# Patient Record
Sex: FEMALE | Race: OTHER | URBAN - METROPOLITAN AREA
[De-identification: names, ages, dates, MRNs, and addresses within clinical notes are randomized per-mention and may not be internally consistent; named-entity substitution may affect disease eponyms.]

---

## 2023-08-22 ENCOUNTER — HOSPITAL ENCOUNTER (INPATIENT)
Facility: HOSPITAL | Age: 88
LOS: 3 days | Discharge: HOME HEALTH | DRG: 840 | End: 2023-08-25
Attending: INTERNAL MEDICINE | Admitting: INTERNAL MEDICINE
Payer: MEDICARE

## 2023-08-22 DIAGNOSIS — R53.81 DEBILITY: ICD-10-CM

## 2023-08-22 DIAGNOSIS — C95.00 ACUTE LEUKEMIA NOT HAVING ACHIEVED REMISSION: ICD-10-CM

## 2023-08-22 DIAGNOSIS — J96.01 ACUTE HYPOXEMIC RESPIRATORY FAILURE: Primary | ICD-10-CM

## 2023-08-22 DIAGNOSIS — J18.9 PNEUMONIA DUE TO INFECTIOUS ORGANISM, UNSPECIFIED LATERALITY, UNSPECIFIED PART OF LUNG: ICD-10-CM

## 2023-08-22 DIAGNOSIS — C92.00 AML (ACUTE MYELOBLASTIC LEUKEMIA): ICD-10-CM

## 2023-08-22 PROBLEM — R41.82 ALTERED MENTAL STATUS: Status: ACTIVE | Noted: 2023-08-22

## 2023-08-22 LAB
ABO + RH BLD: NORMAL
ABO + RH BLD: NORMAL
ALBUMIN SERPL BCP-MCNC: 2.2 G/DL (ref 3.5–5.2)
ALBUMIN SERPL BCP-MCNC: 2.2 G/DL (ref 3.5–5.2)
ALP SERPL-CCNC: 90 U/L (ref 55–135)
ALP SERPL-CCNC: 91 U/L (ref 55–135)
ALT SERPL W/O P-5'-P-CCNC: 7 U/L (ref 10–44)
ALT SERPL W/O P-5'-P-CCNC: 9 U/L (ref 10–44)
ANION GAP SERPL CALC-SCNC: 4 MMOL/L (ref 8–16)
ANION GAP SERPL CALC-SCNC: 4 MMOL/L (ref 8–16)
ASCENDING AORTA: 2.9 CM
AST SERPL-CCNC: 20 U/L (ref 10–40)
AST SERPL-CCNC: 20 U/L (ref 10–40)
AV INDEX (PROSTH): 0.8
AV MEAN GRADIENT: 5 MMHG
AV PEAK GRADIENT: 9 MMHG
AV VALVE AREA BY VELOCITY RATIO: 2.62 CM²
AV VALVE AREA: 2.43 CM²
AV VELOCITY RATIO: 0.86
BASOPHILS NFR BLD: 0 % (ref 0–1.9)
BILIRUB SERPL-MCNC: 0.2 MG/DL (ref 0.1–1)
BILIRUB SERPL-MCNC: 0.2 MG/DL (ref 0.1–1)
BILIRUB UR QL STRIP: NEGATIVE
BLD GP AB SCN CELLS X3 SERPL QL: NORMAL
BUN SERPL-MCNC: 35 MG/DL (ref 8–23)
BUN SERPL-MCNC: 35 MG/DL (ref 8–23)
CALCIUM SERPL-MCNC: 8.8 MG/DL (ref 8.7–10.5)
CALCIUM SERPL-MCNC: 8.8 MG/DL (ref 8.7–10.5)
CHLORIDE SERPL-SCNC: 99 MMOL/L (ref 95–110)
CHLORIDE SERPL-SCNC: 99 MMOL/L (ref 95–110)
CLARITY UR REFRACT.AUTO: CLEAR
CO2 SERPL-SCNC: 35 MMOL/L (ref 23–29)
CO2 SERPL-SCNC: 35 MMOL/L (ref 23–29)
COLOR UR AUTO: YELLOW
CREAT SERPL-MCNC: 0.9 MG/DL (ref 0.5–1.4)
CREAT SERPL-MCNC: 0.9 MG/DL (ref 0.5–1.4)
CV ECHO LV RWT: 0.47 CM
DIFFERENTIAL METHOD: ABNORMAL
DOP CALC AO PEAK VEL: 1.51 M/S
DOP CALC AO VTI: 31.62 CM
DOP CALC LVOT AREA: 3 CM2
DOP CALC LVOT DIAMETER: 1.97 CM
DOP CALC LVOT PEAK VEL: 1.3 M/S
DOP CALC LVOT STROKE VOLUME: 76.95 CM3
DOP CALCLVOT PEAK VEL VTI: 25.26 CM
E WAVE DECELERATION TIME: 239.94 MSEC
E/A RATIO: 1.16
E/E' RATIO: 16.13 M/S
ECHO LV POSTERIOR WALL: 0.9 CM (ref 0.6–1.1)
EOSINOPHIL NFR BLD: 0 % (ref 0–8)
ERYTHROCYTE [DISTWIDTH] IN BLOOD BY AUTOMATED COUNT: 15.2 % (ref 11.5–14.5)
EST. GFR  (NO RACE VARIABLE): >60 ML/MIN/1.73 M^2
EST. GFR  (NO RACE VARIABLE): >60 ML/MIN/1.73 M^2
FRACTIONAL SHORTENING: 43 % (ref 28–44)
GLUCOSE SERPL-MCNC: 150 MG/DL (ref 70–110)
GLUCOSE SERPL-MCNC: 151 MG/DL (ref 70–110)
GLUCOSE UR QL STRIP: NEGATIVE
HBV CORE AB SERPL QL IA: NORMAL
HBV SURFACE AG SERPL QL IA: NORMAL
HCT VFR BLD AUTO: 36.5 % (ref 37–48.5)
HGB BLD-MCNC: 10.7 G/DL (ref 12–16)
HGB UR QL STRIP: NEGATIVE
HIV 1+2 AB+HIV1 P24 AG SERPL QL IA: NORMAL
IMM GRANULOCYTES # BLD AUTO: ABNORMAL K/UL (ref 0–0.04)
IMM GRANULOCYTES NFR BLD AUTO: ABNORMAL % (ref 0–0.5)
INTERVENTRICULAR SEPTUM: 1.1 CM (ref 0.6–1.1)
KETONES UR QL STRIP: NEGATIVE
LA MAJOR: 5.53 CM
LA MINOR: 5.23 CM
LA WIDTH: 4.16 CM
LDH SERPL L TO P-CCNC: 296 U/L (ref 110–260)
LDH SERPL L TO P-CCNC: 306 U/L (ref 110–260)
LEFT ATRIUM SIZE: 3.36 CM
LEFT ATRIUM VOLUME: 63.87 CM3
LEFT INTERNAL DIMENSION IN SYSTOLE: 2.18 CM (ref 2.1–4)
LEFT VENTRICLE DIASTOLIC VOLUME: 46.4 ML
LEFT VENTRICLE SYSTOLIC VOLUME: 15.86 ML
LEFT VENTRICULAR INTERNAL DIMENSION IN DIASTOLE: 3.8 CM (ref 3.5–6)
LEFT VENTRICULAR MASS: 117.28 G
LEUKOCYTE ESTERASE UR QL STRIP: NEGATIVE
LV LATERAL E/E' RATIO: 15.13 M/S
LV SEPTAL E/E' RATIO: 17.29 M/S
LYMPHOCYTES NFR BLD: 11 % (ref 18–48)
MAGNESIUM SERPL-MCNC: 2.3 MG/DL (ref 1.6–2.6)
MAGNESIUM SERPL-MCNC: 2.3 MG/DL (ref 1.6–2.6)
MCH RBC QN AUTO: 29.3 PG (ref 27–31)
MCHC RBC AUTO-ENTMCNC: 29.3 G/DL (ref 32–36)
MCV RBC AUTO: 100 FL (ref 82–98)
MONOCYTES NFR BLD: 9 % (ref 4–15)
MV PEAK A VEL: 1.04 M/S
MV PEAK E VEL: 1.21 M/S
MV STENOSIS PRESSURE HALF TIME: 69.58 MS
MV VALVE AREA P 1/2 METHOD: 3.16 CM2
NEUTROPHILS NFR BLD: 43 % (ref 38–73)
NITRITE UR QL STRIP: NEGATIVE
NRBC BLD-RTO: 0 /100 WBC
PH UR STRIP: 6 [PH] (ref 5–8)
PHOSPHATE SERPL-MCNC: 2.6 MG/DL (ref 2.7–4.5)
PHOSPHATE SERPL-MCNC: 2.6 MG/DL (ref 2.7–4.5)
PISA TR MAX VEL: 2.98 M/S
PLATELET # BLD AUTO: 226 K/UL (ref 150–450)
PLATELET BLD QL SMEAR: ABNORMAL
PMV BLD AUTO: 9.7 FL (ref 9.2–12.9)
POTASSIUM SERPL-SCNC: 5.3 MMOL/L (ref 3.5–5.1)
POTASSIUM SERPL-SCNC: 5.3 MMOL/L (ref 3.5–5.1)
PROT SERPL-MCNC: 6.1 G/DL (ref 6–8.4)
PROT SERPL-MCNC: 6.1 G/DL (ref 6–8.4)
PROT UR QL STRIP: ABNORMAL
RA MAJOR: 4.79 CM
RA PRESSURE ESTIMATED: 3 MMHG
RA WIDTH: 4.25 CM
RBC # BLD AUTO: 3.65 M/UL (ref 4–5.4)
RIGHT ATRIUM VOLUME AREA LENGTH APICAL 4 CHAMBER: 63 ML
RIGHT VENTRICULAR END-DIASTOLIC DIMENSION: 3.65 CM
RV TB RVSP: 6 MMHG
SCHISTOCYTES BLD QL SMEAR: ABNORMAL
SINUS: 3.12 CM
SMUDGE CELLS BLD QL SMEAR: PRESENT
SODIUM SERPL-SCNC: 138 MMOL/L (ref 136–145)
SODIUM SERPL-SCNC: 138 MMOL/L (ref 136–145)
SP GR UR STRIP: 1.02 (ref 1–1.03)
SPECIMEN OUTDATE: NORMAL
STJ: 3.1 CM
TDI LATERAL: 0.08 M/S
TDI SEPTAL: 0.07 M/S
TDI: 0.08 M/S
TR MAX PG: 36 MMHG
TRICUSPID ANNULAR PLANE SYSTOLIC EXCURSION: 3.15 CM
TV REST PULMONARY ARTERY PRESSURE: 39 MMHG
URATE SERPL-MCNC: 9.3 MG/DL (ref 2.4–5.7)
URATE SERPL-MCNC: 9.4 MG/DL (ref 2.4–5.7)
URN SPEC COLLECT METH UR: ABNORMAL
WBC # BLD AUTO: 21.55 K/UL (ref 3.9–12.7)
WBC OTHER NFR BLD MANUAL: 37 %

## 2023-08-22 PROCEDURE — 80053 COMPREHEN METABOLIC PANEL: CPT | Mod: 91 | Performed by: NURSE PRACTITIONER

## 2023-08-22 PROCEDURE — 27000221 HC OXYGEN, UP TO 24 HOURS

## 2023-08-22 PROCEDURE — 88305 TISSUE EXAM BY PATHOLOGIST: CPT | Mod: 59 | Performed by: PATHOLOGY

## 2023-08-22 PROCEDURE — 88189 FLOWCYTOMETRY/READ 16 & >: CPT | Mod: ,,, | Performed by: PATHOLOGY

## 2023-08-22 PROCEDURE — 87340 HEPATITIS B SURFACE AG IA: CPT | Performed by: NURSE PRACTITIONER

## 2023-08-22 PROCEDURE — 93010 EKG 12-LEAD: ICD-10-PCS | Mod: ,,, | Performed by: INTERNAL MEDICINE

## 2023-08-22 PROCEDURE — 88364 CHG INSITU HYBRIDIZATION (FISH: ICD-10-PCS | Mod: 26,,, | Performed by: PATHOLOGY

## 2023-08-22 PROCEDURE — 88342 IMHCHEM/IMCYTCHM 1ST ANTB: CPT | Performed by: PATHOLOGY

## 2023-08-22 PROCEDURE — 99900035 HC TECH TIME PER 15 MIN (STAT)

## 2023-08-22 PROCEDURE — 88364 INSITU HYBRIDIZATION (FISH): CPT | Mod: 26,,, | Performed by: PATHOLOGY

## 2023-08-22 PROCEDURE — 83735 ASSAY OF MAGNESIUM: CPT | Performed by: STUDENT IN AN ORGANIZED HEALTH CARE EDUCATION/TRAINING PROGRAM

## 2023-08-22 PROCEDURE — 87389 HIV-1 AG W/HIV-1&-2 AB AG IA: CPT | Performed by: NURSE PRACTITIONER

## 2023-08-22 PROCEDURE — 88189 PR  FLOWCYTOMETRY/READ, 16 & > MARKERS: ICD-10-PCS | Mod: ,,, | Performed by: PATHOLOGY

## 2023-08-22 PROCEDURE — 86900 BLOOD TYPING SEROLOGIC ABO: CPT | Mod: 91 | Performed by: NURSE PRACTITIONER

## 2023-08-22 PROCEDURE — 88275 CYTOGENETICS 100-300: CPT | Mod: 59

## 2023-08-22 PROCEDURE — 88271 CYTOGENETICS DNA PROBE: CPT | Performed by: STUDENT IN AN ORGANIZED HEALTH CARE EDUCATION/TRAINING PROGRAM

## 2023-08-22 PROCEDURE — 80053 COMPREHEN METABOLIC PANEL: CPT | Performed by: STUDENT IN AN ORGANIZED HEALTH CARE EDUCATION/TRAINING PROGRAM

## 2023-08-22 PROCEDURE — 88365 INSITU HYBRIDIZATION (FISH): CPT | Mod: 26,,, | Performed by: PATHOLOGY

## 2023-08-22 PROCEDURE — 63600175 PHARM REV CODE 636 W HCPCS: Mod: UD | Performed by: STUDENT IN AN ORGANIZED HEALTH CARE EDUCATION/TRAINING PROGRAM

## 2023-08-22 PROCEDURE — 87040 BLOOD CULTURE FOR BACTERIA: CPT | Mod: 59 | Performed by: STUDENT IN AN ORGANIZED HEALTH CARE EDUCATION/TRAINING PROGRAM

## 2023-08-22 PROCEDURE — 85060 BLOOD SMEAR INTERPRETATION: CPT | Mod: ,,, | Performed by: PATHOLOGY

## 2023-08-22 PROCEDURE — 88365 PR  TISSUE HYBRIDIZATION: ICD-10-PCS | Mod: 26,,, | Performed by: PATHOLOGY

## 2023-08-22 PROCEDURE — 83735 ASSAY OF MAGNESIUM: CPT | Mod: 91 | Performed by: NURSE PRACTITIONER

## 2023-08-22 PROCEDURE — 88305 TISSUE EXAM BY PATHOLOGIST: ICD-10-PCS | Mod: 26,,, | Performed by: PATHOLOGY

## 2023-08-22 PROCEDURE — 86704 HEP B CORE ANTIBODY TOTAL: CPT | Performed by: NURSE PRACTITIONER

## 2023-08-22 PROCEDURE — 85027 COMPLETE CBC AUTOMATED: CPT | Performed by: STUDENT IN AN ORGANIZED HEALTH CARE EDUCATION/TRAINING PROGRAM

## 2023-08-22 PROCEDURE — 81450 HL NEO GSAP 5-50DNA/DNA&RNA: CPT | Performed by: STUDENT IN AN ORGANIZED HEALTH CARE EDUCATION/TRAINING PROGRAM

## 2023-08-22 PROCEDURE — 83615 LACTATE (LD) (LDH) ENZYME: CPT | Performed by: STUDENT IN AN ORGANIZED HEALTH CARE EDUCATION/TRAINING PROGRAM

## 2023-08-22 PROCEDURE — 88341 PR IHC OR ICC EACH ADD'L SINGLE ANTIBODY  STAINPR: ICD-10-PCS | Mod: 26,,, | Performed by: PATHOLOGY

## 2023-08-22 PROCEDURE — 88365 INSITU HYBRIDIZATION (FISH): CPT | Performed by: PATHOLOGY

## 2023-08-22 PROCEDURE — 88185 FLOWCYTOMETRY/TC ADD-ON: CPT | Mod: 59 | Performed by: PATHOLOGY

## 2023-08-22 PROCEDURE — 85060 PATHOLOGIST REVIEW: ICD-10-PCS | Mod: ,,, | Performed by: PATHOLOGY

## 2023-08-22 PROCEDURE — 85097 PR  BONE MARROW,SMEAR INTERPRETATION: ICD-10-PCS | Mod: ,,, | Performed by: PATHOLOGY

## 2023-08-22 PROCEDURE — 84550 ASSAY OF BLOOD/URIC ACID: CPT | Performed by: STUDENT IN AN ORGANIZED HEALTH CARE EDUCATION/TRAINING PROGRAM

## 2023-08-22 PROCEDURE — 93005 ELECTROCARDIOGRAM TRACING: CPT

## 2023-08-22 PROCEDURE — 30000890 MAYO MISCELLANEOUS TEST (REFLEX): Performed by: STUDENT IN AN ORGANIZED HEALTH CARE EDUCATION/TRAINING PROGRAM

## 2023-08-22 PROCEDURE — 85007 BL SMEAR W/DIFF WBC COUNT: CPT | Performed by: STUDENT IN AN ORGANIZED HEALTH CARE EDUCATION/TRAINING PROGRAM

## 2023-08-22 PROCEDURE — 84550 ASSAY OF BLOOD/URIC ACID: CPT | Mod: 91 | Performed by: NURSE PRACTITIONER

## 2023-08-22 PROCEDURE — 88311 DECALCIFY TISSUE: CPT | Performed by: PATHOLOGY

## 2023-08-22 PROCEDURE — 81305 MYD88 GENE P.LEU265PRO VRNT: CPT | Performed by: STUDENT IN AN ORGANIZED HEALTH CARE EDUCATION/TRAINING PROGRAM

## 2023-08-22 PROCEDURE — 94761 N-INVAS EAR/PLS OXIMETRY MLT: CPT

## 2023-08-22 PROCEDURE — 88311 DECALCIFY TISSUE: CPT | Mod: 26,,, | Performed by: PATHOLOGY

## 2023-08-22 PROCEDURE — 88341 IMHCHEM/IMCYTCHM EA ADD ANTB: CPT | Performed by: PATHOLOGY

## 2023-08-22 PROCEDURE — 88305 TISSUE EXAM BY PATHOLOGIST: CPT | Mod: 26,,, | Performed by: PATHOLOGY

## 2023-08-22 PROCEDURE — 88237 TISSUE CULTURE BONE MARROW: CPT | Performed by: STUDENT IN AN ORGANIZED HEALTH CARE EDUCATION/TRAINING PROGRAM

## 2023-08-22 PROCEDURE — 99223 1ST HOSP IP/OBS HIGH 75: CPT | Mod: AI,,, | Performed by: INTERNAL MEDICINE

## 2023-08-22 PROCEDURE — 88313 SPECIAL STAINS GROUP 2: CPT | Mod: 59 | Performed by: PATHOLOGY

## 2023-08-22 PROCEDURE — 85097 BONE MARROW INTERPRETATION: CPT | Mod: ,,, | Performed by: PATHOLOGY

## 2023-08-22 PROCEDURE — 20600001 HC STEP DOWN PRIVATE ROOM

## 2023-08-22 PROCEDURE — 84100 ASSAY OF PHOSPHORUS: CPT | Mod: 91 | Performed by: NURSE PRACTITIONER

## 2023-08-22 PROCEDURE — 99223 PR INITIAL HOSPITAL CARE,LEVL III: ICD-10-PCS | Mod: AI,,, | Performed by: INTERNAL MEDICINE

## 2023-08-22 PROCEDURE — 88299 UNLISTED CYTOGENETIC STUDY: CPT | Performed by: STUDENT IN AN ORGANIZED HEALTH CARE EDUCATION/TRAINING PROGRAM

## 2023-08-22 PROCEDURE — 88364 INSITU HYBRIDIZATION (FISH): CPT | Performed by: PATHOLOGY

## 2023-08-22 PROCEDURE — 84100 ASSAY OF PHOSPHORUS: CPT | Performed by: STUDENT IN AN ORGANIZED HEALTH CARE EDUCATION/TRAINING PROGRAM

## 2023-08-22 PROCEDURE — 88342 IMHCHEM/IMCYTCHM 1ST ANTB: CPT | Mod: 26,59,, | Performed by: PATHOLOGY

## 2023-08-22 PROCEDURE — 25000003 PHARM REV CODE 250: Mod: UD | Performed by: STUDENT IN AN ORGANIZED HEALTH CARE EDUCATION/TRAINING PROGRAM

## 2023-08-22 PROCEDURE — 88184 FLOWCYTOMETRY/ TC 1 MARKER: CPT | Performed by: PATHOLOGY

## 2023-08-22 PROCEDURE — 83615 LACTATE (LD) (LDH) ENZYME: CPT | Mod: 91 | Performed by: NURSE PRACTITIONER

## 2023-08-22 PROCEDURE — 88311 PR  DECALCIFY TISSUE: ICD-10-PCS | Mod: 26,,, | Performed by: PATHOLOGY

## 2023-08-22 PROCEDURE — 88341 IMHCHEM/IMCYTCHM EA ADD ANTB: CPT | Mod: 26,,, | Performed by: PATHOLOGY

## 2023-08-22 PROCEDURE — 88342 CHG IMMUNOCYTOCHEMISTRY: ICD-10-PCS | Mod: 26,59,, | Performed by: PATHOLOGY

## 2023-08-22 PROCEDURE — 88264 CHROMOSOME ANALYSIS 20-25: CPT | Performed by: STUDENT IN AN ORGANIZED HEALTH CARE EDUCATION/TRAINING PROGRAM

## 2023-08-22 PROCEDURE — 86900 BLOOD TYPING SEROLOGIC ABO: CPT | Performed by: INTERNAL MEDICINE

## 2023-08-22 PROCEDURE — 88313 SPECIAL STAINS GROUP 2: CPT | Mod: 26,,, | Performed by: PATHOLOGY

## 2023-08-22 PROCEDURE — 93010 ELECTROCARDIOGRAM REPORT: CPT | Mod: ,,, | Performed by: INTERNAL MEDICINE

## 2023-08-22 PROCEDURE — 81003 URINALYSIS AUTO W/O SCOPE: CPT | Performed by: STUDENT IN AN ORGANIZED HEALTH CARE EDUCATION/TRAINING PROGRAM

## 2023-08-22 PROCEDURE — 88271 CYTOGENETICS DNA PROBE: CPT | Mod: 59

## 2023-08-22 PROCEDURE — 88313 PR  SPECIAL STAINS,GROUP II: ICD-10-PCS | Mod: 26,,, | Performed by: PATHOLOGY

## 2023-08-22 RX ORDER — LIDOCAINE HYDROCHLORIDE 20 MG/ML
10 INJECTION, SOLUTION INFILTRATION; PERINEURAL ONCE
Status: DISCONTINUED | OUTPATIENT
Start: 2023-08-22 | End: 2023-08-22

## 2023-08-22 RX ORDER — IPRATROPIUM BROMIDE AND ALBUTEROL SULFATE 2.5; .5 MG/3ML; MG/3ML
3 SOLUTION RESPIRATORY (INHALATION) EVERY 6 HOURS
Status: DISCONTINUED | OUTPATIENT
Start: 2023-08-22 | End: 2023-08-22

## 2023-08-22 RX ORDER — NALOXONE HCL 0.4 MG/ML
0.02 VIAL (ML) INJECTION
Status: DISCONTINUED | OUTPATIENT
Start: 2023-08-22 | End: 2023-08-25 | Stop reason: HOSPADM

## 2023-08-22 RX ORDER — SODIUM CHLORIDE 0.9 % (FLUSH) 0.9 %
10 SYRINGE (ML) INJECTION EVERY 12 HOURS PRN
Status: DISCONTINUED | OUTPATIENT
Start: 2023-08-22 | End: 2023-08-25 | Stop reason: HOSPADM

## 2023-08-22 RX ORDER — IBUPROFEN 200 MG
16 TABLET ORAL
Status: DISCONTINUED | OUTPATIENT
Start: 2023-08-22 | End: 2023-08-25 | Stop reason: HOSPADM

## 2023-08-22 RX ORDER — ALLOPURINOL 300 MG/1
300 TABLET ORAL DAILY
Status: DISCONTINUED | OUTPATIENT
Start: 2023-08-23 | End: 2023-08-25 | Stop reason: HOSPADM

## 2023-08-22 RX ORDER — LORAZEPAM 2 MG/ML
0.5 INJECTION INTRAMUSCULAR ONCE AS NEEDED
Status: DISCONTINUED | OUTPATIENT
Start: 2023-08-22 | End: 2023-08-22

## 2023-08-22 RX ORDER — IPRATROPIUM BROMIDE AND ALBUTEROL SULFATE 2.5; .5 MG/3ML; MG/3ML
3 SOLUTION RESPIRATORY (INHALATION) EVERY 4 HOURS PRN
Status: DISCONTINUED | OUTPATIENT
Start: 2023-08-22 | End: 2023-08-25 | Stop reason: HOSPADM

## 2023-08-22 RX ORDER — GLUCAGON 1 MG
1 KIT INJECTION
Status: DISCONTINUED | OUTPATIENT
Start: 2023-08-22 | End: 2023-08-25 | Stop reason: HOSPADM

## 2023-08-22 RX ORDER — HYDROMORPHONE HYDROCHLORIDE 1 MG/ML
0.5 INJECTION, SOLUTION INTRAMUSCULAR; INTRAVENOUS; SUBCUTANEOUS ONCE AS NEEDED
Status: COMPLETED | OUTPATIENT
Start: 2023-08-22 | End: 2023-08-22

## 2023-08-22 RX ORDER — ASPIRIN 81 MG/1
81 TABLET ORAL DAILY
COMMUNITY

## 2023-08-22 RX ORDER — LOSARTAN POTASSIUM 50 MG/1
50 TABLET ORAL DAILY
COMMUNITY
Start: 2023-04-27

## 2023-08-22 RX ORDER — LIDOCAINE HYDROCHLORIDE 20 MG/ML
10 INJECTION, SOLUTION INFILTRATION; PERINEURAL ONCE
Status: COMPLETED | OUTPATIENT
Start: 2023-08-22 | End: 2023-08-22

## 2023-08-22 RX ORDER — IBUPROFEN 200 MG
24 TABLET ORAL
Status: DISCONTINUED | OUTPATIENT
Start: 2023-08-22 | End: 2023-08-25 | Stop reason: HOSPADM

## 2023-08-22 RX ADMIN — LIDOCAINE HYDROCHLORIDE 10 ML: 20 INJECTION, SOLUTION INFILTRATION; PERINEURAL at 02:08

## 2023-08-22 RX ADMIN — HYDROMORPHONE HYDROCHLORIDE 0.5 MG: 1 INJECTION, SOLUTION INTRAMUSCULAR; INTRAVENOUS; SUBCUTANEOUS at 02:08

## 2023-08-22 RX ADMIN — AZITHROMYCIN MONOHYDRATE 500 MG: 500 INJECTION, POWDER, LYOPHILIZED, FOR SOLUTION INTRAVENOUS at 05:08

## 2023-08-22 RX ADMIN — CEFTRIAXONE 1 G: 1 INJECTION, POWDER, FOR SOLUTION INTRAMUSCULAR; INTRAVENOUS at 05:08

## 2023-08-22 NOTE — H&P
[facility] - Oncology ([facility])  Hematology  Bone Marrow Transplant  H&P    Subjective:     Principal Problem: Acute leukemia    HPI: Patient is an 88 year old female with listed past medical history of pacemaker placement, history of COVID19 infection, history of PE (not currently on anticoagulation) who presented to outside hospital on 8/19 with shortness of breath. Patient is a poor historian and it is unclear what her baseline mental status is as family reports her as high functioning but she is unable to answer simple questions at bedside; history gathered with discussion with daughter over the phone and grand daughter at bedside. Daughter reports that patient was in her normal state of health until about 1 week ago when family was reporting that patient was not quite herself; she is unable to elaborate further in this regard. Patient was taken to the ED by her daughter because she felt that the patient looked off color and was having mucus production and shortness of breath. Outside hospital records not that she had O2 saturations as low as 40s and that she was saturating at 90s with 4L NC. She had CT chest with multifocal pneumonia and bilateral pleural effusions. She was treated with rocephin, doxycycline, and lasix. Patient was found to have leukocytosis of 50k. Pathologists review of cbc notes Large cells with blue cytoplasm and large nucleoli, negative for mikie rods, morphologically suggestive of monoblasts and promonocytes. Flow cytometry pending. Family at bedside report that she presented to the hospital 2 years ago with similar presentation and that she was noted to have significantly elevated WBC count at this time as well. They also note history of cancer in the family but are unable to elaborate further.       Patient information was obtained from patient, relative(s), past medical records, and ER records.     Oncology History: N/A     Medications Prior to Admission   Medication Sig Dispense Refill  Last Dose    aspirin (ECOTRIN) 81 MG EC tablet Take 81 mg by mouth once daily.   8/22/2023    losartan (COZAAR) 50 MG tablet Take 50 mg by mouth once daily.   Unknown       Shellfish containing products and Iodine     Past Medical History:   Diagnosis Date    Anemia, unspecified     Asthma     CHF (congestive heart failure)      History reviewed. No pertinent surgical history.  Family History       Problem Relation (Age of Onset)    Leukemia Father, Sister, Sister, Brother          Tobacco Use    Smoking status: Never    Smokeless tobacco: Never   Substance and Sexual Activity    Alcohol use: Never    Drug use: Never    Sexual activity: Not on file       Review of Systems   Unable to perform ROS: Other   Possible mental status change but with unknown baseline  Objective:     Vital Signs (Most Recent):  Temp: 99.2 °F (37.3 °C) (08/22/23 1205)  Pulse: 82 (08/22/23 1205)  Resp: 18 (08/22/23 1444)  BP: 135/62 (08/22/23 1205)  SpO2: 98 % (08/22/23 1221) Vital Signs (24h Range):  Temp:  [99.2 °F (37.3 °C)-99.9 °F (37.7 °C)] 99.2 °F (37.3 °C)  Pulse:  [82-89] 82  Resp:  [16-18] 18  SpO2:  [93 %-98 %] 98 %  BP: (130-135)/(56-62) 135/62     Weight: 55.8 kg (123 lb)  There is no height or weight on file to calculate BMI.  There is no height or weight on file to calculate BSA.      Lines/Drains/Airways       Peripheral Intravenous Line  Duration                  Peripheral IV - Single Lumen 08/19/23 18 G Anterior;Left;Proximal Forearm 3 days                     Physical Exam  Vitals reviewed.   Constitutional:       General: She is not in acute distress.     Appearance: She is well-developed. She is ill-appearing. She is not toxic-appearing.      Comments: Cachectic    HENT:      Head: Normocephalic and atraumatic.      Right Ear: External ear normal.      Left Ear: External ear normal.      Nose: Nose normal.      Mouth/Throat:      Mouth: Mucous membranes are moist.      Pharynx: Oropharynx is clear. No  oropharyngeal exudate.   Eyes:      General: No scleral icterus.     Extraocular Movements: Extraocular movements intact.      Conjunctiva/sclera: Conjunctivae normal.      Pupils: Pupils are equal, round, and reactive to light.   Neck:      Comments: Large nodule noted on right shoulder  Cardiovascular:      Rate and Rhythm: Normal rate and regular rhythm.      Heart sounds: Murmur (systolic) heard.   Pulmonary:      Effort: Pulmonary effort is normal.      Breath sounds: Rales (bilateral lung fields) present. No wheezing.   Abdominal:      General: Bowel sounds are normal. There is no distension.      Palpations: Abdomen is soft.      Tenderness: There is no abdominal tenderness.   Musculoskeletal:         General: No deformity. Normal range of motion.      Cervical back: Normal range of motion.      Right lower leg: No edema.      Left lower leg: No edema.   Skin:     General: Skin is warm and dry.   Neurological:      Mental Status: She is alert. She is disoriented.      Comments: Possible facial drop, no other cranial nerve deficit noted            Significant Labs:   All pertinent labs from the last 24 hours have been reviewed.    Diagnostic Results:  I have reviewed all pertinent imaging results/findings within the past 24 hours.    Assessment/Plan:     Neuro  Altered mental status  Patient with unclear baseline mentation. Daughter over the phone reports that patient has been living independently and capable of managing ADLs. However at time of exam, patient unable to answer simple questions. She is unable to repeat any medical information even with told her plan of care just prior. Mild facial drop of unknown chronicity on exam, remainder of exam grossly non focal.     - Repeat infectious work up, including UA  - Hold off on CT head for now    Pulmonary  Pneumonia due to infectious organism  Acute hypoxemic respiratory failure    Patient presented to outside hospital with hypoxemic respiratory failure with  CTA showing multifocal pneumonia. Patient currently on 4-5L NC. Afebrile currently and prior to transfer. Blood cultures without growth for the last 48 hours. Patient was treated with rocephin and doxycycline on 8/20.    - Continue with rocephin 1 g q24 hours for 5 days, and azithromycin 500 mg for 3 days  - Repeat blood cultures drawn  - incentive spirometry, acapella, and duoneb    Oncology  Acute leukemia  88 year old female with symptoms of generalized fatigue and worsening shortness of breath who presents as a transfer for concern of acute leukemia. Pathologist review of cbc notes relative blast count of 24% with report of large cells with blue cytoplasm and large nucleoli. Negative for Edward rods. Morphologically suggestive of monoblasts and promonocytes. Flow cytometry pending.     - Repeat cbc, cmp, mag, phos, ldh, uric acid on admit  - Will check coagulation studies and fibrinogen  - Bone marrow biopsy completed at bedside, sent to pathology with studies pending  - Hold off on hydrea for now  - allopurinol 300 mg daily    Other  Debility  - PT/OT consulted        VTE Risk Mitigation (From admission, onward)         Ordered     IP VTE HIGH RISK PATIENT  Once         08/22/23 1240     Place sequential compression device  Until discontinued         08/22/23 1240     Reason for No Pharmacological VTE Prophylaxis  Once        Question:  Reasons:  Answer:  Thrombocytopenia    08/22/23 1240                Disposition: Continue inpatient care    Gagandeep Perez MD  Bone Marrow Transplant  Hematology  [facility] - Oncology ([facility])

## 2023-08-22 NOTE — NURSING
Nurses Note -- 4 Eyes      8/22/2023   5:01 PM      Skin assessed during: Admit      [] No Altered Skin Integrity Present    []Prevention Measures Documented      [x] Yes- Altered Skin Integrity Present or Discovered   [] LDA Added if Not in Epic (Describe Wound)   [] New Altered Skin Integrity was Present on Admit and Documented in LDA   [] Wound Image Taken    Red nonblanchable to bilateral buttock. Sacral dressing applied. Will apply waffle mattress and start turn 2hr for pt. Also noted golf ball sized bump to right shoulder, MD aware. All other skin dry, clean, and intact.    Wound Care Consulted? No    Attending Nurse:  Italia Gayle RN    Second RN/Staff Member:   Monique Qureshi RN

## 2023-08-22 NOTE — SUBJECTIVE & OBJECTIVE
Patient information was obtained from patient, relative(s), past medical records, and ER records.     Oncology History: N/A     Medications Prior to Admission   Medication Sig Dispense Refill Last Dose    aspirin (ECOTRIN) 81 MG EC tablet Take 81 mg by mouth once daily.   8/22/2023    losartan (COZAAR) 50 MG tablet Take 50 mg by mouth once daily.   Unknown       Shellfish containing products and Iodine     Past Medical History:   Diagnosis Date    Anemia, unspecified     Asthma     CHF (congestive heart failure)      History reviewed. No pertinent surgical history.  Family History       Problem Relation (Age of Onset)    Leukemia Father, Sister, Sister, Brother          Tobacco Use    Smoking status: Never    Smokeless tobacco: Never   Substance and Sexual Activity    Alcohol use: Never    Drug use: Never    Sexual activity: Not on file       Review of Systems   Unable to perform ROS: Other   Possible mental status change but with unknown baseline  Objective:     Vital Signs (Most Recent):  Temp: 99.2 °F (37.3 °C) (08/22/23 1205)  Pulse: 82 (08/22/23 1205)  Resp: 18 (08/22/23 1444)  BP: 135/62 (08/22/23 1205)  SpO2: 98 % (08/22/23 1221) Vital Signs (24h Range):  Temp:  [99.2 °F (37.3 °C)-99.9 °F (37.7 °C)] 99.2 °F (37.3 °C)  Pulse:  [82-89] 82  Resp:  [16-18] 18  SpO2:  [93 %-98 %] 98 %  BP: (130-135)/(56-62) 135/62     Weight: 55.8 kg (123 lb)  There is no height or weight on file to calculate BMI.  There is no height or weight on file to calculate BSA.      Lines/Drains/Airways       Peripheral Intravenous Line  Duration                  Peripheral IV - Single Lumen 08/19/23 18 G Anterior;Left;Proximal Forearm 3 days                     Physical Exam  Vitals reviewed.   Constitutional:       General: She is not in acute distress.     Appearance: She is well-developed. She is ill-appearing. She is not toxic-appearing.      Comments: Cachectic    HENT:      Head: Normocephalic and atraumatic.      Right Ear:  External ear normal.      Left Ear: External ear normal.      Nose: Nose normal.      Mouth/Throat:      Mouth: Mucous membranes are moist.      Pharynx: Oropharynx is clear. No oropharyngeal exudate.   Eyes:      General: No scleral icterus.     Extraocular Movements: Extraocular movements intact.      Conjunctiva/sclera: Conjunctivae normal.      Pupils: Pupils are equal, round, and reactive to light.   Neck:      Comments: Large nodule noted on right shoulder  Cardiovascular:      Rate and Rhythm: Normal rate and regular rhythm.      Heart sounds: Murmur (systolic) heard.   Pulmonary:      Effort: Pulmonary effort is normal.      Breath sounds: Rales (bilateral lung fields) present. No wheezing.   Abdominal:      General: Bowel sounds are normal. There is no distension.      Palpations: Abdomen is soft.      Tenderness: There is no abdominal tenderness.   Musculoskeletal:         General: No deformity. Normal range of motion.      Cervical back: Normal range of motion.      Right lower leg: No edema.      Left lower leg: No edema.   Skin:     General: Skin is warm and dry.   Neurological:      Mental Status: She is alert. She is disoriented.      Comments: Possible facial drop, no other cranial nerve deficit noted            Significant Labs:   All pertinent labs from the last 24 hours have been reviewed.    Diagnostic Results:  I have reviewed all pertinent imaging results/findings within the past 24 hours.

## 2023-08-22 NOTE — ASSESSMENT & PLAN NOTE
88 year old female with symptoms of generalized fatigue and worsening shortness of breath who presents as a transfer for concern of acute leukemia. Pathologist review of cbc notes relative blast count of 24% with report of large cells with blue cytoplasm and large nucleoli. Negative for Edward rods. Morphologically suggestive of monoblasts and promonocytes. Flow cytometry pending.     - Repeat cbc, cmp, mag, phos, ldh, uric acid on admit  - Will check coagulation studies and fibrinogen  - Bone marrow biopsy completed at bedside, sent to pathology with studies pending  - Hold off on hydrea for now  - allopurinol 300 mg daily

## 2023-08-22 NOTE — ASSESSMENT & PLAN NOTE
Patient with unclear baseline mentation. Daughter over the phone reports that patient has been living independently and capable of managing ADLs. However at time of exam, patient unable to answer simple questions. She is unable to repeat any medical information even with told her plan of care just prior. Mild facial drop of unknown chronicity on exam, remainder of exam grossly non focal.     - Repeat infectious work up, including UA  - Hold off on CT head for now

## 2023-08-22 NOTE — PLAN OF CARE
Pt AAOx2. Disoriented to time and situation. Granddaughter and daughter at bedside. Bone marrow biopsy done at bedside. Pt tolerated well.  No drainage on bandage. UA collected. Awaiting results. Purewick in place. Red nonblanchable noted at nya buttocks. Sacrum dressing in place.Turn q2h. Antibiotics currently infusing. Tolerating diet, voiding without difficulty. All VSS; pt sats in 90s with 4L NC. Pt remaining free from falls or injury throughout shift; bed locked and in lowest position; call light within reach. Pt instructed to call for assistance as needed. Q1H rounding done on pt.

## 2023-08-22 NOTE — PROGRESS NOTES
Pt admitted into room 856. Pt arrived via stretcher with two nurses. MD notified of pt arrival.    Pt AAO x2. Disoriented to time and situation. Vitals and assessment as charted. Pacemaker noted at Left chest. Right lump noted at right shoulder. Red nonblanchable to nya buttocks.     Upon arrival to room, pt oriented to room, floor, and call light. Bed locked and in lowest position. Bed alarm in use. Call light within reach. Instructed to call for assistance.

## 2023-08-22 NOTE — ASSESSMENT & PLAN NOTE
Acute hypoxemic respiratory failure    Patient presented to outside hospital with hypoxemic respiratory failure with CTA showing multifocal pneumonia. Patient currently on 4-5L NC. Afebrile currently and prior to transfer. Blood cultures without growth for the last 48 hours. Patient was treated with rocephin and doxycycline on 8/20.    - Continue with rocephin 1 g q24 hours for 5 days, and azithromycin 500 mg for 3 days  - Repeat blood cultures drawn  - incentive spirometry, acapella, and duoneb

## 2023-08-22 NOTE — PROCEDURES
PROCEDURE NOTE:  Date of Procedure: 8/22/23  Procedure: Bone Marrow Biopsy and Aspiration  Indication: Concern for acute leukemia  Consent: Informed consent was obtained from patient.  Timeout: Done and documented.  Position: lateral decubitus   Site: Left posterior illiac crest.  Prep: Betadine.  Needle used: 11 gauge Jamshidi needle.  Anesthetic: 2% lidocaine 10 cc.  Biopsy: The biopsy needle was introduced into the marrow cavity and an aspirate was obtained without complications and sent for flow cytometry and cytogenetics. Core biopsy obtained without difficulty and sent for routine histologic examination.  Complications: None.  Disposition: The patient was placed supine for 15min following procedure. RN to assess bandaid for bleeding prior to discharge home.  Blood loss: Minimal.     Gagandeep Perez MD  PGY5  Malignant Hematology & Bone Marrow Transplant

## 2023-08-22 NOTE — HPI
Patient is an 88 year old female with listed past medical history of pacemaker placement, history of COVID19 infection, history of PE (not currently on anticoagulation) who presented to outside hospital on 8/19 with shortness of breath. Patient is a poor historian and it is unclear what her baseline mental status is as family reports her as high functioning but she is unable to answer simple questions at bedside; history gathered with discussion with daughter over the phone and grand daughter at bedside. Daughter reports that patient was in her normal state of health until about 1 week ago when family was reporting that patient was not quite herself; she is unable to elaborate further in this regard. Patient was taken to the ED by her daughter because she felt that the patient looked off color and was having mucus production and shortness of breath. Outside hospital records not that she had O2 saturations as low as 40s and that she was saturating at 90s with 4L NC. She had CT chest with multifocal pneumonia and bilateral pleural effusions. She was treated with rocephin, doxycycline, and lasix. Patient was found to have leukocytosis of 50k. Pathologists review of cbc notes Large cells with blue cytoplasm and large nucleoli, negative for mikie rods, morphologically suggestive of monoblasts and promonocytes. Flow cytometry pending. Family at bedside report that she presented to the hospital 2 years ago with similar presentation and that she was noted to have significantly elevated WBC count at this time as well. They also note history of cancer in the family but are unable to elaborate further.

## 2023-08-23 LAB
ALBUMIN SERPL BCP-MCNC: 2.1 G/DL (ref 3.5–5.2)
ALP SERPL-CCNC: 86 U/L (ref 55–135)
ALT SERPL W/O P-5'-P-CCNC: 8 U/L (ref 10–44)
ANION GAP SERPL CALC-SCNC: 6 MMOL/L (ref 8–16)
ANISOCYTOSIS BLD QL SMEAR: SLIGHT
APTT PPP: 28.5 SEC (ref 21–32)
AST SERPL-CCNC: 23 U/L (ref 10–40)
BASO STIPL BLD QL SMEAR: ABNORMAL
BASOPHILS # BLD AUTO: ABNORMAL K/UL (ref 0–0.2)
BASOPHILS NFR BLD: 0 % (ref 0–1.9)
BILIRUB SERPL-MCNC: 0.2 MG/DL (ref 0.1–1)
BUN SERPL-MCNC: 32 MG/DL (ref 8–23)
CALCIUM SERPL-MCNC: 8.9 MG/DL (ref 8.7–10.5)
CHLORIDE SERPL-SCNC: 101 MMOL/L (ref 95–110)
CO2 SERPL-SCNC: 32 MMOL/L (ref 23–29)
CREAT SERPL-MCNC: 0.6 MG/DL (ref 0.5–1.4)
DIFFERENTIAL METHOD: ABNORMAL
EOSINOPHIL # BLD AUTO: ABNORMAL K/UL (ref 0–0.5)
EOSINOPHIL NFR BLD: 0 % (ref 0–8)
ERYTHROCYTE [DISTWIDTH] IN BLOOD BY AUTOMATED COUNT: 15.3 % (ref 11.5–14.5)
EST. GFR  (NO RACE VARIABLE): >60 ML/MIN/1.73 M^2
FIBRINOGEN PPP-MCNC: 470 MG/DL (ref 182–400)
GIANT PLATELETS BLD QL SMEAR: PRESENT
GLUCOSE SERPL-MCNC: 88 MG/DL (ref 70–110)
HCT VFR BLD AUTO: 34.6 % (ref 37–48.5)
HGB BLD-MCNC: 10 G/DL (ref 12–16)
HYPOCHROMIA BLD QL SMEAR: ABNORMAL
IMM GRANULOCYTES # BLD AUTO: ABNORMAL K/UL (ref 0–0.04)
IMM GRANULOCYTES NFR BLD AUTO: ABNORMAL % (ref 0–0.5)
INR PPP: 1 (ref 0.8–1.2)
LDH SERPL L TO P-CCNC: 331 U/L (ref 110–260)
LYMPHOCYTES # BLD AUTO: ABNORMAL K/UL (ref 1–4.8)
LYMPHOCYTES NFR BLD: 13 % (ref 18–48)
MAGNESIUM SERPL-MCNC: 2.2 MG/DL (ref 1.6–2.6)
MCH RBC QN AUTO: 28.7 PG (ref 27–31)
MCHC RBC AUTO-ENTMCNC: 28.9 G/DL (ref 32–36)
MCV RBC AUTO: 99 FL (ref 82–98)
MONOCYTES # BLD AUTO: ABNORMAL K/UL (ref 0.3–1)
MONOCYTES NFR BLD: 4 % (ref 4–15)
MYELOCYTES NFR BLD MANUAL: 1 %
NEUTROPHILS NFR BLD: 40 % (ref 38–73)
NEUTS BAND NFR BLD MANUAL: 2 %
NRBC BLD-RTO: 0 /100 WBC
PATH REV BLD -IMP: NORMAL
PHOSPHATE SERPL-MCNC: 2.3 MG/DL (ref 2.7–4.5)
PLATELET # BLD AUTO: 201 K/UL (ref 150–450)
PLATELET BLD QL SMEAR: ABNORMAL
PMV BLD AUTO: 9.6 FL (ref 9.2–12.9)
POTASSIUM SERPL-SCNC: 5.7 MMOL/L (ref 3.5–5.1)
PROT SERPL-MCNC: 5.7 G/DL (ref 6–8.4)
PROTHROMBIN TIME: 10.9 SEC (ref 9–12.5)
RBC # BLD AUTO: 3.48 M/UL (ref 4–5.4)
SMUDGE CELLS BLD QL SMEAR: PRESENT
SODIUM SERPL-SCNC: 139 MMOL/L (ref 136–145)
SPHEROCYTES BLD QL SMEAR: ABNORMAL
TARGETS BLD QL SMEAR: ABNORMAL
URATE SERPL-MCNC: 8.9 MG/DL (ref 2.4–5.7)
WBC # BLD AUTO: 21.85 K/UL (ref 3.9–12.7)
WBC OTHER NFR BLD MANUAL: 40 %

## 2023-08-23 PROCEDURE — 99233 SBSQ HOSP IP/OBS HIGH 50: CPT | Mod: ,,, | Performed by: INTERNAL MEDICINE

## 2023-08-23 PROCEDURE — 36415 COLL VENOUS BLD VENIPUNCTURE: CPT | Performed by: NURSE PRACTITIONER

## 2023-08-23 PROCEDURE — 83735 ASSAY OF MAGNESIUM: CPT | Performed by: NURSE PRACTITIONER

## 2023-08-23 PROCEDURE — 85007 BL SMEAR W/DIFF WBC COUNT: CPT | Performed by: NURSE PRACTITIONER

## 2023-08-23 PROCEDURE — 63600175 PHARM REV CODE 636 W HCPCS: Mod: UD | Performed by: STUDENT IN AN ORGANIZED HEALTH CARE EDUCATION/TRAINING PROGRAM

## 2023-08-23 PROCEDURE — 85610 PROTHROMBIN TIME: CPT | Performed by: STUDENT IN AN ORGANIZED HEALTH CARE EDUCATION/TRAINING PROGRAM

## 2023-08-23 PROCEDURE — 25000003 PHARM REV CODE 250: Mod: UD

## 2023-08-23 PROCEDURE — 83615 LACTATE (LD) (LDH) ENZYME: CPT | Performed by: NURSE PRACTITIONER

## 2023-08-23 PROCEDURE — 84550 ASSAY OF BLOOD/URIC ACID: CPT | Performed by: NURSE PRACTITIONER

## 2023-08-23 PROCEDURE — 85730 THROMBOPLASTIN TIME PARTIAL: CPT | Performed by: STUDENT IN AN ORGANIZED HEALTH CARE EDUCATION/TRAINING PROGRAM

## 2023-08-23 PROCEDURE — 80053 COMPREHEN METABOLIC PANEL: CPT | Performed by: NURSE PRACTITIONER

## 2023-08-23 PROCEDURE — 99233 PR SUBSEQUENT HOSPITAL CARE,LEVL III: ICD-10-PCS | Mod: ,,, | Performed by: INTERNAL MEDICINE

## 2023-08-23 PROCEDURE — 25000003 PHARM REV CODE 250: Performed by: INTERNAL MEDICINE

## 2023-08-23 PROCEDURE — 63700000 PHARM REV CODE 250 ALT 637 W/O HCPCS: Performed by: INTERNAL MEDICINE

## 2023-08-23 PROCEDURE — 85384 FIBRINOGEN ACTIVITY: CPT | Performed by: STUDENT IN AN ORGANIZED HEALTH CARE EDUCATION/TRAINING PROGRAM

## 2023-08-23 PROCEDURE — 85027 COMPLETE CBC AUTOMATED: CPT | Performed by: NURSE PRACTITIONER

## 2023-08-23 PROCEDURE — 38222 DX BONE MARROW BX & ASPIR: CPT

## 2023-08-23 PROCEDURE — 97165 OT EVAL LOW COMPLEX 30 MIN: CPT

## 2023-08-23 PROCEDURE — 97530 THERAPEUTIC ACTIVITIES: CPT

## 2023-08-23 PROCEDURE — 20600001 HC STEP DOWN PRIVATE ROOM

## 2023-08-23 PROCEDURE — 36415 COLL VENOUS BLD VENIPUNCTURE: CPT | Performed by: STUDENT IN AN ORGANIZED HEALTH CARE EDUCATION/TRAINING PROGRAM

## 2023-08-23 PROCEDURE — 84100 ASSAY OF PHOSPHORUS: CPT | Performed by: NURSE PRACTITIONER

## 2023-08-23 PROCEDURE — 25000003 PHARM REV CODE 250: Performed by: STUDENT IN AN ORGANIZED HEALTH CARE EDUCATION/TRAINING PROGRAM

## 2023-08-23 RX ORDER — ENOXAPARIN SODIUM 100 MG/ML
40 INJECTION SUBCUTANEOUS EVERY 24 HOURS
Status: DISCONTINUED | OUTPATIENT
Start: 2023-08-23 | End: 2023-08-25 | Stop reason: HOSPADM

## 2023-08-23 RX ORDER — SODIUM CHLORIDE, SODIUM LACTATE, POTASSIUM CHLORIDE, CALCIUM CHLORIDE 600; 310; 30; 20 MG/100ML; MG/100ML; MG/100ML; MG/100ML
INJECTION, SOLUTION INTRAVENOUS CONTINUOUS
Status: ACTIVE | OUTPATIENT
Start: 2023-08-23 | End: 2023-08-23

## 2023-08-23 RX ORDER — AZITHROMYCIN 250 MG/1
500 TABLET, FILM COATED ORAL
Status: DISCONTINUED | OUTPATIENT
Start: 2023-08-23 | End: 2023-08-24

## 2023-08-23 RX ORDER — MUPIROCIN 20 MG/G
OINTMENT TOPICAL 2 TIMES DAILY
Status: DISCONTINUED | OUTPATIENT
Start: 2023-08-23 | End: 2023-08-25 | Stop reason: HOSPADM

## 2023-08-23 RX ORDER — ALLOPURINOL 300 MG/1
300 TABLET ORAL 2 TIMES DAILY
Status: CANCELLED | OUTPATIENT
Start: 2023-08-23

## 2023-08-23 RX ADMIN — ALLOPURINOL 300 MG: 300 TABLET ORAL at 09:08

## 2023-08-23 RX ADMIN — SODIUM PHOSPHATE, MONOBASIC, MONOHYDRATE AND SODIUM PHOSPHATE, DIBASIC, ANHYDROUS 20.01 MMOL: 142; 276 INJECTION, SOLUTION INTRAVENOUS at 06:08

## 2023-08-23 RX ADMIN — SODIUM ZIRCONIUM CYCLOSILICATE 10 G: 5 POWDER, FOR SUSPENSION ORAL at 06:08

## 2023-08-23 RX ADMIN — MUPIROCIN: 20 OINTMENT TOPICAL at 09:08

## 2023-08-23 RX ADMIN — MUPIROCIN: 20 OINTMENT TOPICAL at 08:08

## 2023-08-23 RX ADMIN — AZITHROMYCIN MONOHYDRATE 500 MG: 250 TABLET ORAL at 06:08

## 2023-08-23 RX ADMIN — CEFTRIAXONE 1 G: 1 INJECTION, POWDER, FOR SOLUTION INTRAMUSCULAR; INTRAVENOUS at 06:08

## 2023-08-23 RX ADMIN — ENOXAPARIN SODIUM 40 MG: 40 INJECTION SUBCUTANEOUS at 06:08

## 2023-08-23 RX ADMIN — SODIUM CHLORIDE, POTASSIUM CHLORIDE, SODIUM LACTATE AND CALCIUM CHLORIDE: 600; 310; 30; 20 INJECTION, SOLUTION INTRAVENOUS at 09:08

## 2023-08-23 NOTE — PT/OT/SLP EVAL
Occupational Therapy   Evaluation    Name: Rosmery Ramos  MRN: [id number]  Admitting Diagnosis: <principal problem not specified>  Recent Surgery: * No surgery found *      Recommendations:     Discharge Recommendations: other (see comments)  Discharge Equipment Recommendations:  bath bench, other (see comments) (TBD pending progress)  Barriers to discharge:       Assessment:     Rosmery Ramos is a 88 y.o. female with a medical diagnosis of <principal problem not specified>.   Pt tolerated session well. Pt pleasantly disorientation this date, AAO to Person only. Pt found with HOB elevated and daughter at bedside. Pt required CGA- Min A and increased timing for mobility this date 2/2 confusion, easily distracted, requiring frequent redirection. She presents with deficits listed below. Performance deficits affecting function: weakness, impaired endurance, impaired self care skills, impaired functional mobility, gait instability, decreased coordination, decreased safety awareness, impaired cognition.      Rehab Prognosis: Good; patient would benefit from acute skilled OT services to address these deficits and reach maximum level of function.       Plan:     Patient to be seen 3 x/week to address the above listed problems via self-care/home management, therapeutic activities, therapeutic exercises  Plan of Care Expires: 09/22/23  Plan of Care Reviewed with: patient, daughter    Subjective     Chief Complaint: None  Patient/Family Comments/goals: Per daughter, improve cognition and return home.      Pt is poor historian 2/2 to confusion. Information obtained from daughter at bedside.  Occupational Profile:  Living Environment: Pt lives alone in a H with threshold to enter. Pt has T/S with raised toilet and no grab bars.   Previous level of function: (I) with ADLs, functional mobility, currently drives  Roles and Routines: Pt is , lives active lifestyle. Enjoys cooking,  gardening, and mowing the  lawn.  Equipment Used at Home: none  Assistance upon Discharge: Daughter will be able to assist as needed.     Pain/Comfort:  Pain Rating 1: 0/10    Patients cultural, spiritual, Hindu conflicts given the current situation: no    Objective:     Communicated with: FRANCISCA Ramos prior to session.  Patient found HOB elevated with oxygen, PureWick, peripheral IV, bed alarm upon OT entry to room.    General Precautions: Standard, fall  Orthopedic Precautions: N/A  Braces: N/A  Respiratory Status: Nasal cannula, flow 5 L/min    Occupational Performance:    Bed Mobility:    Patient completed Rolling/Turning to Left with  stand by assistance  Patient completed Scooting/Bridging with stand by assistance  Patient completed Supine to Sit with contact guard assistance  Patient completed Sit to Supine with moderate assistance with LE management.    Functional Mobility/Transfers:  Patient completed Sit <> Stand Transfer with minimum assistance  with  hand-held assist .  X first attempt.  CGA x 2 attempts with increased time and visual demonstration.  Functional Mobility: Pt able to take ~8 lateral steps along EOB R<>L with CGA, HHA and increased timing.     Activities of Daily Living:  Lower Body Dressing: moderate assistance to joy non slip sock, Pt able to reach and grab sock with forward chaining and visual demonstration. Pt able to independently doff sock with increased timing and frequent redirection.      Cognitive/Visual Perceptual:  Cognitive/Psychosocial Skills:     -       Oriented to: Person   -       Follows Commands/attention:Inattentive, Easily distracted, and Follows one-step commands 25% of time  -       Communication: clear/fluent  -       Memory: Impaired STM and Poor immediate recall  -       Safety awareness/insight to disability: impaired   -       Mood/Affect/Coping skills/emotional control: Pleasant    Physical Exam:  Dominant hand: -       R  Upper Extremity Range of Motion:  -       Right Upper Extremity:  WFL  -       Left Upper Extremity: WFL  Upper Extremity Strength: -       Right Upper Extremity: 3/5  -       Left Upper Extremity: 3/5   Strength: -       Right Upper Extremity: WNL  -       Left Upper Extremity: WNL    AMPAC 6 Click ADL:  AMPAC Total Score: 18    Treatment & Education:  Role of OT and OT POC  Lowerbody Dressing on figure 4 technique to joy/doff clothing  to decrease forward trunk flexion and maintain balance while seated EOB  Educated on OOB activity with staff A only and impact on increasing functional independence.  Educated on importance of progressive mobilization to increase strength and endurance.      Patient left HOB elevated with all lines intact, call button in reach, bed alarm on, RN notified, and daughter present    GOALS:   Multidisciplinary Problems       Occupational Therapy Goals          Problem: Occupational Therapy    Goal Priority Disciplines Outcome Interventions   Occupational Therapy Goal     OT, PT/OT Ongoing, Progressing    Description: Goals to be met by: 9/13/2023     Patient will increase functional independence with ADLs by performing:    Feeding with Supervision.  UE Dressing with Stand-by Assistance.  LE Dressing with Stand-by Assistance.  Grooming while standing at sink with Stand-by Assistance.  Toileting from toilet with Stand-by Assistance for hygiene and clothing management.   Toilet transfer to toilet with Stand-by Assistance.                         History:     Past Medical History:   Diagnosis Date    Anemia, unspecified     Asthma     CHF (congestive heart failure)        History reviewed. No pertinent surgical history.    Time Tracking:     OT Date of Treatment: 08/23/23  OT Start Time: 1000  OT Stop Time: 1034  OT Total Time (min): 34 min    Billable Minutes:Evaluation 10  Therapeutic Activity 24    8/23/2023

## 2023-08-23 NOTE — PROGRESS NOTES
Pharmacist Intervention IV to PO Note    Rosmery Ramos is a 88 y.o. female being treated with IV medication azithromycin    Patient Data:    Vital Signs (Most Recent):  Temp: 98.5 °F (36.9 °C) (08/23/23 1307)  Pulse: 81 (08/23/23 1307)  Resp: 18 (08/23/23 1307)  BP: 131/60 (08/23/23 1307)  SpO2: 95 % (08/23/23 1307) Vital Signs (72h Range):  Temp:  [97.8 °F (36.6 °C)-99.9 °F (37.7 °C)]   Pulse:  [75-90]   Resp:  [16-19]   BP: (130-157)/(56-68)   SpO2:  [90 %-98 %]      CBC:  Recent Labs   Lab 08/22/23  1809 08/23/23  0319   WBC 21.55* 21.85*   RBC 3.65* 3.48*   HGB 10.7* 10.0*   HCT 36.5* 34.6*    201   * 99*   MCH 29.3 28.7   MCHC 29.3* 28.9*     CMP:     Recent Labs   Lab 08/22/23  1809 08/22/23  1810 08/23/23  0319   * 151* 88   CALCIUM 8.8 8.8 8.9   ALBUMIN 2.2* 2.2* 2.1*   PROT 6.1 6.1 5.7*    138 139   K 5.3* 5.3* 5.7*   CO2 35* 35* 32*   CL 99 99 101   BUN 35* 35* 32*   CREATININE 0.9 0.9 0.6   ALKPHOS 90 91 86   ALT 9* 7* 8*   AST 20 20 23   BILITOT 0.2 0.2 0.2       Dietary Orders:  Diet Orders            Diet Adult Regular (IDDSI Level 7) Standard Tray: Regular starting at 08/22 1241            Based on the following criteria, this patient qualifies for intravenous to oral conversion:  [x] The patients gastrointestinal tract is functioning (tolerating medications via oral or enteral route for 24 hours and tolerating food or enteral feeds for 24 hours).  [x] The patient is hemodynamically stable for 24 hours (heart rate <100 beats per minute, systolic blood pressure >99 mm Hg, and respiratory rate <20 breaths per minute).  [x] The patient shows clinical improvement (afebrile for at least 24 hours and white blood cell count downtrending or normalized). Additionally, the patient must be non-neutropenic (absolute neutrophil count >500 cells/mm3).  [x] For antimicrobials, the patient has received IV therapy for at least 24 hours.    IV medication azithromycin will be changed to  oral medication azithromycin    Pharmacist's Name: Marian Yeager, PharmD  Pharmacist's Extension: [phone]

## 2023-08-23 NOTE — SUBJECTIVE & OBJECTIVE
Subjective:     Interval History: No acute events overnight, vital signs stable. She remains afebrile. Alert and oriented x1 this morning, same as yesterday. Bone marrow biopsy results pending. Had long discussion with patient and daughter at bedside. We explained that the concern is for an acute leukemia but given the patient's advanced age and co-morbidities she would not be a candidate for intensive induction or transplant. We explained that more tolerable regimens are available that have the potential to provide meaningful time and quality of life. Daughter expresses that she agrees that family is not on board for intensive treatment. We will receive preliminary information on her recent bone marrow in the next 1-2 days and discuss overall treatment plan prior to discharge.     Objective:     Vital Signs (Most Recent):  Temp: 97.8 °F (36.6 °C) (08/23/23 0750)  Pulse: 90 (08/23/23 0750)  Resp: 18 (08/23/23 0750)  BP: (!) 157/68 (08/23/23 0750)  SpO2: (!) 90 % (08/23/23 0750) Vital Signs (24h Range):  Temp:  [97.8 °F (36.6 °C)-98.7 °F (37.1 °C)] 97.8 °F (36.6 °C)  Pulse:  [75-90] 90  Resp:  [16-19] 18  SpO2:  [90 %-97 %] 90 %  BP: (130-157)/(60-68) 157/68     Weight: 60.2 kg (132 lb 11.5 oz)  Body mass index is 28.72 kg/m².  Body surface area is 1.56 meters squared.      Intake/Output - Last 3 Shifts         08/21 0700  08/22 0659 08/22 0700 08/23 0659 08/23 0700 08/24 0659    P.O.  240     IV Piggyback  347.2     Total Intake(mL/kg)  587.2 (9.8)     Urine (mL/kg/hr)  520     Other  0     Stool  0     Total Output  520     Net  +67.2            Urine Occurrence  2 x     Stool Occurrence  0 x              Physical Exam  Vitals reviewed.   Constitutional:       General: She is not in acute distress.     Appearance: She is well-developed. She is ill-appearing. She is not toxic-appearing.      Comments: Cachectic    HENT:      Head: Normocephalic and atraumatic.      Right Ear: External ear normal.      Left Ear:  External ear normal.      Nose: Nose normal.      Mouth/Throat:      Mouth: Mucous membranes are moist.      Pharynx: Oropharynx is clear. No oropharyngeal exudate.   Eyes:      General: No scleral icterus.     Extraocular Movements: Extraocular movements intact.      Conjunctiva/sclera: Conjunctivae normal.      Pupils: Pupils are equal, round, and reactive to light.   Neck:      Comments: Large nodule noted on right shoulder  Cardiovascular:      Rate and Rhythm: Normal rate and regular rhythm.      Heart sounds: Murmur (systolic) heard.   Pulmonary:      Effort: Pulmonary effort is normal.      Breath sounds: Rales (bilateral lung fields) present. No wheezing.   Abdominal:      General: Bowel sounds are normal. There is no distension.      Palpations: Abdomen is soft.      Tenderness: There is no abdominal tenderness.   Musculoskeletal:         General: No deformity. Normal range of motion.      Cervical back: Normal range of motion.      Right lower leg: No edema.      Left lower leg: No edema.   Skin:     General: Skin is warm and dry.   Neurological:      Mental Status: She is alert. She is disoriented.      Comments: Possible facial drop, no other cranial nerve deficit noted            Significant Labs:   All pertinent labs from the last 24 hours have been reviewed.    Diagnostic Results:  I have reviewed all pertinent imaging results/findings within the past 24 hours.

## 2023-08-23 NOTE — PLAN OF CARE
POC reviewed with pt's daughter and granddaughter. Pt is AAOx1 to self; disoriented to place, time, and situation. VSS. Afebrile. Pt was pretty lethargic and slept the entire shift after she had rec'd Dilaudid 0.5 mg x1. Purewick in place and draining properly with a UOP of 300 mls. No BM noted. 4L NC with humidifier. BM BX CDI. Turned Q2H. Awaiting bm bx results. Bed alarm on with call light in reach; daughter at the bedside. Monitored Q2H. No new orders at this time.

## 2023-08-23 NOTE — HOSPITAL COURSE
08/23/2023 No acute events overnight, vital signs stable. She remains afebrile. Alert and oriented x1 this morning, same as yesterday. Bone marrow biopsy results pending. Had long discussion with patient and daughter at bedside. We explained that the concern is for an acute leukemia but given the patient's advanced age and co-morbidities she would not be a candidate for intensive induction or transplant. We explained that more tolerable regimens are available that have the potential to provide meaningful time and quality of life. Daughter expresses that she agrees that family is not on board for intensive treatment. We will receive preliminary information on her recent bone marrow in the next 1-2 days and discuss overall treatment plan prior to discharge.   08/24/2023 No acute events overnight, vital signs stable, afebrile. Patient more oriented this morning; person time and place. She remains on 2-4L NC, but desaturates to 65-70% on room air. Bone marrow biopsy pathology pending. Will change antibiotics to levofloxacin for 5 day course to complete treatment for pneumonia. Will also order oxygen for home use. Plan for follow up in [place] early next week for further management/treatment of her leukemia.   08/25/2023 No acute events overnight, afebrile. Vital signs stable but patient remains on 4L nasal cannula to saturate at 95%. Patient desaturates to 66% on room air at rest. She returns to 95% with 4L O2 on nasal cannula. 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 Patient to be discharged home with daughter and has follow up with Dr. Neri in [place] on 8/30.

## 2023-08-23 NOTE — PLAN OF CARE
Evaluation completed. Goals established.    Problem: Occupational Therapy  Goal: Occupational Therapy Goal  Description: Goals to be met by: 9/13/2023     Patient will increase functional independence with ADLs by performing:    Feeding with Supervision.  UE Dressing with Stand-by Assistance.  LE Dressing with Stand-by Assistance.  Grooming while standing at sink with Stand-by Assistance.  Toileting from toilet with Stand-by Assistance for hygiene and clothing management.   Toilet transfer to toilet with Stand-by Assistance.    Outcome: Ongoing, Progressing    Nasreen Darden OTR/L

## 2023-08-23 NOTE — PROGRESS NOTES
[facility] ([facility])  Hematology  Bone Marrow Transplant  Progress Note    Patient Name: Rosmery Ramos  Admission Date: 8/22/2023  Hospital Length of Stay: 1 days  Code Status: Full Code    Subjective:     Interval History: No acute events overnight, vital signs stable. She remains afebrile. Alert and oriented x1 this morning, same as yesterday. Bone marrow biopsy results pending. Had long discussion with patient and daughter at bedside. We explained that the concern is for an acute leukemia but given the patient's advanced age and co-morbidities she would not be a candidate for intensive induction or transplant. We explained that more tolerable regimens are available that have the potential to provide meaningful time and quality of life. Daughter expresses that she agrees that family is not on board for intensive treatment. We will receive preliminary information on her recent bone marrow in the next 1-2 days and discuss overall treatment plan prior to discharge.     Objective:     Vital Signs (Most Recent):  Temp: 97.8 °F (36.6 °C) (08/23/23 0750)  Pulse: 90 (08/23/23 0750)  Resp: 18 (08/23/23 0750)  BP: (!) 157/68 (08/23/23 0750)  SpO2: (!) 90 % (08/23/23 0750) Vital Signs (24h Range):  Temp:  [97.8 °F (36.6 °C)-98.7 °F (37.1 °C)] 97.8 °F (36.6 °C)  Pulse:  [75-90] 90  Resp:  [16-19] 18  SpO2:  [90 %-97 %] 90 %  BP: (130-157)/(60-68) 157/68     Weight: 60.2 kg (132 lb 11.5 oz)  Body mass index is 28.72 kg/m².  Body surface area is 1.56 meters squared.      Intake/Output - Last 3 Shifts         08/21 0700 08/22 0659 08/22 0700 08/23 0659 08/23 0700 08/24 0659    P.O.  240     IV Piggyback  347.2     Total Intake(mL/kg)  587.2 (9.8)     Urine (mL/kg/hr)  520     Other  0     Stool  0     Total Output  520     Net  +67.2            Urine Occurrence  2 x     Stool Occurrence  0 x              Physical Exam  Vitals reviewed.   Constitutional:       General: She is not in acute distress.     Appearance:  She is well-developed. She is ill-appearing. She is not toxic-appearing.      Comments: Cachectic    HENT:      Head: Normocephalic and atraumatic.      Right Ear: External ear normal.      Left Ear: External ear normal.      Nose: Nose normal.      Mouth/Throat:      Mouth: Mucous membranes are moist.      Pharynx: Oropharynx is clear. No oropharyngeal exudate.   Eyes:      General: No scleral icterus.     Extraocular Movements: Extraocular movements intact.      Conjunctiva/sclera: Conjunctivae normal.      Pupils: Pupils are equal, round, and reactive to light.   Neck:      Comments: Large nodule noted on right shoulder  Cardiovascular:      Rate and Rhythm: Normal rate and regular rhythm.      Heart sounds: Murmur (systolic) heard.   Pulmonary:      Effort: Pulmonary effort is normal.      Breath sounds: Rales (bilateral lung fields) present. No wheezing.   Abdominal:      General: Bowel sounds are normal. There is no distension.      Palpations: Abdomen is soft.      Tenderness: There is no abdominal tenderness.   Musculoskeletal:         General: No deformity. Normal range of motion.      Cervical back: Normal range of motion.      Right lower leg: No edema.      Left lower leg: No edema.   Skin:     General: Skin is warm and dry.   Neurological:      Mental Status: She is alert. She is disoriented.      Comments: Possible facial drop, no other cranial nerve deficit noted            Significant Labs:   All pertinent labs from the last 24 hours have been reviewed.    Diagnostic Results:  I have reviewed all pertinent imaging results/findings within the past 24 hours.    Assessment/Plan:     Altered mental status  Patient with unclear baseline mentation. Daughter over the phone reports that patient has been living independently and capable of managing ADLs. However at time of exam, patient unable to answer simple questions. She is unable to repeat any medical information even with told her plan of care just  prior. Mild facial drop of unknown chronicity on exam, remainder of exam grossly non focal.     - Infectious work up unremarkable, UA clear  - Concern for encephalopathy due to ongoing infection, vs underlying dementia exacerbated by infection and malignancy  - Hold off on CT head for now    Debility  - PT/OT consulted    Pneumonia due to infectious organism  Acute hypoxemic respiratory failure    Patient presented to outside hospital with hypoxemic respiratory failure with CTA showing multifocal pneumonia. Patient currently on 4-5L NC. Afebrile currently and prior to transfer. Blood cultures without growth for the last 48 hours. Patient was treated with rocephin and doxycycline on 8/20.    - Continue with rocephin 1 g q24 hours for 5 days, and azithromycin 500 mg for 3 days  - Repeat blood cultures drawn  - incentive spirometry, acapella, and duoneb    Acute leukemia  88 year old female with symptoms of generalized fatigue and worsening shortness of breath who presents as a transfer for concern of acute leukemia. Pathologist review of cbc notes relative blast count of 24% with report of large cells with blue cytoplasm and large nucleoli. Negative for Edward rods. Morphologically suggestive of monoblasts and promonocytes. Flow cytometry pending.     - cbc, cmp, mag, phos, ldh, uric acid reviewed  - Will check coagulation studies and fibrinogen  - Bone marrow biopsy completed at bedside, sent to pathology with studies pending  - Hold off on hydrea for now  - allopurinol 300 mg daily        VTE Risk Mitigation (From admission, onward)         Ordered     enoxaparin injection 40 mg  Every 24 hours         08/23/23 0917     IP VTE HIGH RISK PATIENT  Once         08/22/23 1240     Place sequential compression device  Until discontinued         08/22/23 1240     Reason for No Pharmacological VTE Prophylaxis  Once        Question:  Reasons:  Answer:  Thrombocytopenia    08/22/23 1240                Disposition: Continue  inpatient care    Gagandeep Perez MD  Bone Marrow Transplant  [facility] - Oncology ([facility])

## 2023-08-23 NOTE — ASSESSMENT & PLAN NOTE
Patient with unclear baseline mentation. Daughter over the phone reports that patient has been living independently and capable of managing ADLs. However at time of exam, patient unable to answer simple questions. She is unable to repeat any medical information even with told her plan of care just prior. Mild facial drop of unknown chronicity on exam, remainder of exam grossly non focal.     - Infectious work up unremarkable, UA clear  - Concern for encephalopathy due to ongoing infection, vs underlying dementia exacerbated by infection and malignancy  - Hold off on CT head for now

## 2023-08-23 NOTE — ASSESSMENT & PLAN NOTE
88 year old female with symptoms of generalized fatigue and worsening shortness of breath who presents as a transfer for concern of acute leukemia. Pathologist review of cbc notes relative blast count of 24% with report of large cells with blue cytoplasm and large nucleoli. Negative for Edward rods. Morphologically suggestive of monoblasts and promonocytes. Flow cytometry pending.     - cbc, cmp, mag, phos, ldh, uric acid reviewed  - Will check coagulation studies and fibrinogen  - Bone marrow biopsy completed at bedside, sent to pathology with studies pending  - Hold off on hydrea for now  - allopurinol 300 mg daily

## 2023-08-24 LAB
ALBUMIN SERPL BCP-MCNC: 2.1 G/DL (ref 3.5–5.2)
ALP SERPL-CCNC: 87 U/L (ref 55–135)
ALT SERPL W/O P-5'-P-CCNC: 8 U/L (ref 10–44)
ANION GAP SERPL CALC-SCNC: 4 MMOL/L (ref 8–16)
ANISOCYTOSIS BLD QL SMEAR: SLIGHT
AST SERPL-CCNC: 21 U/L (ref 10–40)
BASOPHILS # BLD AUTO: ABNORMAL K/UL (ref 0–0.2)
BASOPHILS NFR BLD: 0 % (ref 0–1.9)
BILIRUB SERPL-MCNC: 0.3 MG/DL (ref 0.1–1)
BODY SITE - BONE MARROW: NORMAL
BUN SERPL-MCNC: 20 MG/DL (ref 8–23)
CALCIUM SERPL-MCNC: 8.8 MG/DL (ref 8.7–10.5)
CHLORIDE SERPL-SCNC: 97 MMOL/L (ref 95–110)
CLINICAL DIAGNOSIS - BONE MARROW: NORMAL
CO2 SERPL-SCNC: 39 MMOL/L (ref 23–29)
CREAT SERPL-MCNC: 0.6 MG/DL (ref 0.5–1.4)
DIFFERENTIAL METHOD: ABNORMAL
DNA/RNA EXTRACT AND HOLD RESULT: NORMAL
DNA/RNA EXTRACTION: NORMAL
EOSINOPHIL # BLD AUTO: ABNORMAL K/UL (ref 0–0.5)
EOSINOPHIL NFR BLD: 0 % (ref 0–8)
ERYTHROCYTE [DISTWIDTH] IN BLOOD BY AUTOMATED COUNT: 15 % (ref 11.5–14.5)
EST. GFR  (NO RACE VARIABLE): >60 ML/MIN/1.73 M^2
EXHR SPECIMEN TYPE: NORMAL
FLOW CYTOMETRY ANTIBODIES ANALYZED - BONE MARROW: NORMAL
FLOW CYTOMETRY COMMENT - BONE MARROW: NORMAL
FLOW CYTOMETRY INTERPRETATION - BONE MARROW: NORMAL
GLUCOSE SERPL-MCNC: 91 MG/DL (ref 70–110)
HCT VFR BLD AUTO: 35.5 % (ref 37–48.5)
HGB BLD-MCNC: 10.7 G/DL (ref 12–16)
IMM GRANULOCYTES # BLD AUTO: ABNORMAL K/UL (ref 0–0.04)
IMM GRANULOCYTES NFR BLD AUTO: ABNORMAL % (ref 0–0.5)
LDH SERPL L TO P-CCNC: 278 U/L (ref 110–260)
LYMPHOCYTES # BLD AUTO: ABNORMAL K/UL (ref 1–4.8)
LYMPHOCYTES NFR BLD: 21 % (ref 18–48)
MAGNESIUM SERPL-MCNC: 2.1 MG/DL (ref 1.6–2.6)
MCH RBC QN AUTO: 29.1 PG (ref 27–31)
MCHC RBC AUTO-ENTMCNC: 30.1 G/DL (ref 32–36)
MCV RBC AUTO: 97 FL (ref 82–98)
MONOCYTES # BLD AUTO: ABNORMAL K/UL (ref 0.3–1)
MONOCYTES NFR BLD: 4 % (ref 4–15)
MYELOCYTES NFR BLD MANUAL: 1 %
NEUTROPHILS NFR BLD: 28 % (ref 38–73)
NEUTS BAND NFR BLD MANUAL: 1 %
NRBC BLD-RTO: 0 /100 WBC
PHOSPHATE SERPL-MCNC: 2.4 MG/DL (ref 2.7–4.5)
PLATELET # BLD AUTO: 226 K/UL (ref 150–450)
PLATELET BLD QL SMEAR: ABNORMAL
PMV BLD AUTO: 9.9 FL (ref 9.2–12.9)
POTASSIUM SERPL-SCNC: 4.8 MMOL/L (ref 3.5–5.1)
PROT SERPL-MCNC: 5.7 G/DL (ref 6–8.4)
RBC # BLD AUTO: 3.68 M/UL (ref 4–5.4)
SMUDGE CELLS BLD QL SMEAR: PRESENT
SODIUM SERPL-SCNC: 140 MMOL/L (ref 136–145)
URATE SERPL-MCNC: 5.9 MG/DL (ref 2.4–5.7)
WBC # BLD AUTO: 22.82 K/UL (ref 3.9–12.7)
WBC OTHER NFR BLD MANUAL: 45 %

## 2023-08-24 PROCEDURE — 20600001 HC STEP DOWN PRIVATE ROOM

## 2023-08-24 PROCEDURE — 84550 ASSAY OF BLOOD/URIC ACID: CPT | Performed by: NURSE PRACTITIONER

## 2023-08-24 PROCEDURE — 36415 COLL VENOUS BLD VENIPUNCTURE: CPT | Performed by: NURSE PRACTITIONER

## 2023-08-24 PROCEDURE — 99233 PR SUBSEQUENT HOSPITAL CARE,LEVL III: ICD-10-PCS | Mod: GC,,, | Performed by: INTERNAL MEDICINE

## 2023-08-24 PROCEDURE — 25000003 PHARM REV CODE 250: Performed by: NURSE PRACTITIONER

## 2023-08-24 PROCEDURE — 80053 COMPREHEN METABOLIC PANEL: CPT | Performed by: NURSE PRACTITIONER

## 2023-08-24 PROCEDURE — 83735 ASSAY OF MAGNESIUM: CPT | Performed by: NURSE PRACTITIONER

## 2023-08-24 PROCEDURE — 25000003 PHARM REV CODE 250: Performed by: STUDENT IN AN ORGANIZED HEALTH CARE EDUCATION/TRAINING PROGRAM

## 2023-08-24 PROCEDURE — 25000003 PHARM REV CODE 250: Performed by: INTERNAL MEDICINE

## 2023-08-24 PROCEDURE — 85007 BL SMEAR W/DIFF WBC COUNT: CPT | Performed by: NURSE PRACTITIONER

## 2023-08-24 PROCEDURE — 97161 PT EVAL LOW COMPLEX 20 MIN: CPT

## 2023-08-24 PROCEDURE — 99233 SBSQ HOSP IP/OBS HIGH 50: CPT | Mod: GC,,, | Performed by: INTERNAL MEDICINE

## 2023-08-24 PROCEDURE — 85027 COMPLETE CBC AUTOMATED: CPT | Performed by: NURSE PRACTITIONER

## 2023-08-24 PROCEDURE — 94761 N-INVAS EAR/PLS OXIMETRY MLT: CPT

## 2023-08-24 PROCEDURE — 97116 GAIT TRAINING THERAPY: CPT

## 2023-08-24 PROCEDURE — 83615 LACTATE (LD) (LDH) ENZYME: CPT | Performed by: NURSE PRACTITIONER

## 2023-08-24 PROCEDURE — 84100 ASSAY OF PHOSPHORUS: CPT | Performed by: NURSE PRACTITIONER

## 2023-08-24 PROCEDURE — 63600175 PHARM REV CODE 636 W HCPCS: Mod: UD | Performed by: STUDENT IN AN ORGANIZED HEALTH CARE EDUCATION/TRAINING PROGRAM

## 2023-08-24 RX ORDER — LEVOFLOXACIN 750 MG/1
750 TABLET ORAL DAILY
Qty: 5 TABLET | Refills: 0 | Status: SHIPPED | OUTPATIENT
Start: 2023-08-24 | End: 2023-08-29

## 2023-08-24 RX ORDER — ALLOPURINOL 300 MG/1
300 TABLET ORAL DAILY
Qty: 30 TABLET | Refills: 0 | Status: SHIPPED | OUTPATIENT
Start: 2023-08-25 | End: 2023-09-24

## 2023-08-24 RX ORDER — LEVOFLOXACIN 750 MG/1
750 TABLET ORAL DAILY
Status: DISCONTINUED | OUTPATIENT
Start: 2023-08-24 | End: 2023-08-25 | Stop reason: HOSPADM

## 2023-08-24 RX ADMIN — ENOXAPARIN SODIUM 40 MG: 40 INJECTION SUBCUTANEOUS at 05:08

## 2023-08-24 RX ADMIN — MUPIROCIN: 20 OINTMENT TOPICAL at 09:08

## 2023-08-24 RX ADMIN — POTASSIUM PHOSPHATE, MONOBASIC AND POTASSIUM PHOSPHATE, DIBASIC 20 MMOL: 224; 236 INJECTION, SOLUTION, CONCENTRATE INTRAVENOUS at 09:08

## 2023-08-24 RX ADMIN — MUPIROCIN: 20 OINTMENT TOPICAL at 08:08

## 2023-08-24 RX ADMIN — ALLOPURINOL 300 MG: 300 TABLET ORAL at 09:08

## 2023-08-24 RX ADMIN — LEVOFLOXACIN 750 MG: 750 TABLET, FILM COATED ORAL at 10:08

## 2023-08-24 NOTE — PLAN OF CARE
Problem: Physical Therapy  Goal: Physical Therapy Goal  Outcome: Met     No goals set due to pt. scheduled for hospital discharge today.

## 2023-08-24 NOTE — ASSESSMENT & PLAN NOTE
Acute hypoxemic respiratory failure    Patient presented to outside hospital with hypoxemic respiratory failure with CTA showing multifocal pneumonia. Patient currently on 4-5L NC. Afebrile currently and prior to transfer. Blood cultures without growth for the last 48 hours. Patient was treated with rocephin and doxycycline on 8/20.    - Rocephin and azithromycin changed to levofloxacin for 5 days  - Repeat blood cultures, NGTD  - incentive spirometry, acapella, and duoneb  - 6 minute walk test complete, home O2 ordered

## 2023-08-24 NOTE — DISCHARGE SUMMARY
[facility] ([facility])  Hematology  Bone Marrow Transplant  Discharge Summary      Patient Name: Rosmery Ramos  MRN: [id number]  Admission Date: 8/22/2023  Hospital Length of Stay: 2 days  Discharge Date and Time:  08/24/2023 1:23 PM  Attending Physician: Kendall Hoffmann MD   Discharging Provider: Gagandeep Perez MD  Primary Care Provider: Nuvia, Primary Doctor    HPI: Patient is an 88 year old female with listed past medical history of pacemaker placement, history of COVID19 infection, history of PE (not currently on anticoagulation) who presented to outside hospital on 8/19 with shortness of breath. Patient is a poor historian and it is unclear what her baseline mental status is as family reports her as high functioning but she is unable to answer simple questions at bedside; history gathered with discussion with daughter over the phone and grand daughter at bedside. Daughter reports that patient was in her normal state of health until about 1 week ago when family was reporting that patient was not quite herself; she is unable to elaborate further in this regard. Patient was taken to the ED by her daughter because she felt that the patient looked off color and was having mucus production and shortness of breath. Outside hospital records not that she had O2 saturations as low as 40s and that she was saturating at 90s with 4L NC. She had CT chest with multifocal pneumonia and bilateral pleural effusions. She was treated with rocephin, doxycycline, and lasix. Patient was found to have leukocytosis of 50k. Pathologists review of cbc notes Large cells with blue cytoplasm and large nucleoli, negative for mikie rods, morphologically suggestive of monoblasts and promonocytes. Flow cytometry pending. Family at bedside report that she presented to the hospital 2 years ago with similar presentation and that she was noted to have significantly elevated WBC count at this time as well. They also note history of  cancer in the family but are unable to elaborate further.       * No surgery found *     Hospital Course: 08/23/2023 No acute events overnight, vital signs stable. She remains afebrile. Alert and oriented x1 this morning, same as yesterday. Bone marrow biopsy results pending. Had long discussion with patient and daughter at bedside. We explained that the concern is for an acute leukemia but given the patient's advanced age and co-morbidities she would not be a candidate for intensive induction or transplant. We explained that more tolerable regimens are available that have the potential to provide meaningful time and quality of life. Daughter expresses that she agrees that family is not on board for intensive treatment. We will receive preliminary information on her recent bone marrow in the next 1-2 days and discuss overall treatment plan prior to discharge.   08/24/2023 No acute events overnight, vital signs stable, afebrile. Patient more oriented this morning; person time and place. She remains on 2-4L NC, but desaturates to 66% on room air at rest. Bone marrow biopsy pathology pending. Will change antibiotics to levofloxacin for 5 day course to complete treatment for pneumonia. Will also order oxygen for home use. Plan for follow up in [place] early next week for further management/treatment of her leukemia.         Goals of Care Treatment Preferences:  Code Status: Full Code          Significant Diagnostic Studies: Labs:   CMP   Recent Labs   Lab 08/22/23  1810 08/23/23 0319 08/24/23  0416    139 140   K 5.3* 5.7* 4.8   CL 99 101 97   CO2 35* 32* 39*   * 88 91   BUN 35* 32* 20   CREATININE 0.9 0.6 0.6   CALCIUM 8.8 8.9 8.8   PROT 6.1 5.7* 5.7*   ALBUMIN 2.2* 2.1* 2.1*   BILITOT 0.2 0.2 0.3   ALKPHOS 91 86 87   AST 20 23 21   ALT 7* 8* 8*   ANIONGAP 4* 6* 4*   , CBC   Recent Labs   Lab 08/22/23  1809 08/23/23 0319 08/24/23  0416   WBC 21.55* 21.85* 22.82*   HGB 10.7* 10.0* 10.7*   HCT 36.5*  "34.6* 35.5*    201 226    and INR   Lab Results   Component Value Date    INR 1.0 08/23/2023       Pending Diagnostic Studies:       Procedure Component Value Units Date/Time    Specimen to Pathology, Bone Marrow Aspiration/Biopsy [[id number]] Collected: 08/22/23 1442    Order Status: Sent Lab Status: In process Updated: 08/22/23 1550    Specimen: Bone Marrow           Final Active Diagnoses:    Diagnosis Date Noted POA    PRINCIPAL PROBLEM:  Acute leukemia [C95.00] 08/22/2023 Yes    Pneumonia due to infectious organism [J18.9] 08/22/2023 Yes    Acute hypoxemic respiratory failure [J96.01] 08/22/2023 Yes    Debility [R53.81] 08/22/2023 Yes    Altered mental status [R41.82] 08/22/2023 Yes      Problems Resolved During this Admission:      Discharged Condition: stable    Disposition: Home or Self Care    Follow Up:    Patient Instructions:      OXYGEN FOR HOME USE     Order Specific Question Answer Comments   Liter Flow 4    Duration Continuous    Qualifying Test Performed at: Rest    Oxygen saturation: 66    Portable mode: continuous    Route nasal cannula    Device: home concentrator with portable tanks    Length of need (in months): 3 mos    Patient condition with qualifying saturation Other - List qualifying diagnosis and code    Select a diagnosis & list the code in the comments Pneumonia [545973]    Height: 4' 9" (1.448 m)    Weight: 60.2 kg (132 lb 11.5 oz)    Alternative treatment measures have been tried or considered and deemed clinically ineffective. Yes      WALKER FOR HOME USE     Order Specific Question Answer Comments   Type of Walker: Adult (5'4"-6'6")    With wheels? Yes    Height: 4' 9" (1.448 m)    Weight: 60.2 kg (132 lb 11.5 oz)    Length of need (1-99 months): 99    Does patient have medical equipment at home? none    Please check all that apply: Patient's condition impairs ambulation.    Please check all that apply: Patient is unable to safely ambulate without equipment.      Notify your " health care provider if you experience any of the following:  temperature >100.4     Notify your health care provider if you experience any of the following:  persistent nausea and vomiting or diarrhea     Notify your health care provider if you experience any of the following:  severe uncontrolled pain     Notify your health care provider if you experience any of the following:  redness, tenderness, or signs of infection (pain, swelling, redness, odor or green/yellow discharge around incision site)     Notify your health care provider if you experience any of the following:  difficulty breathing or increased cough     Notify your health care provider if you experience any of the following:  severe persistent headache     Notify your health care provider if you experience any of the following:  persistent dizziness, light-headedness, or visual disturbances     Notify your health care provider if you experience any of the following:  increased confusion or weakness     Activity as tolerated     Medications:  Reconciled Home Medications:      Medication List        START taking these medications      allopurinoL 300 MG tablet  Commonly known as: ZYLOPRIM  Take 1 tablet (300 mg total) by mouth once daily.  Start taking on: August 25, 2023     levoFLOXacin 750 MG tablet  Commonly known as: LEVAQUIN  Take 1 tablet (750 mg total) by mouth once daily. for 5 days            CONTINUE taking these medications      aspirin 81 MG EC tablet  Commonly known as: ECOTRIN  Take 81 mg by mouth once daily.     losartan 50 MG tablet  Commonly known as: COZAAR  Take 50 mg by mouth once daily.              Gagandeep Perez MD  Bone Marrow Transplant  [facility] - Oncology ([facility])

## 2023-08-24 NOTE — PLAN OF CARE
No acute events this shift. Patient disoriented to situation. Afebrile and VSS. No complaints of pain or nausea. Pure Wick in place. Turn q2. Daughter remains at bedside. Bed alarm on. Bed in lowest position and locked. Side rails up x2. All possessions and call light within reach. Non-skid socks on. Instructed to call for assistance and voiced understanding. All needs of patient currently met. Will continue to monitor with frequent rounding.

## 2023-08-24 NOTE — PLAN OF CARE
Patient is disoriented to situation. Up with assistance, family at the bedside, bed alarm on. Call light and personal items within reach. Patient and family member involved in care. Pure wick changed and continued. Oxygen continued at 4 l/min. Regular diet with poor intake. Discharged postponed for tomorrow as home oxygen was unavailable today. IV electrolyte provided. Patient stable at this time.

## 2023-08-24 NOTE — PROGRESS NOTES
Received request to assist with DME.  Spoke to patient and family.  They have previously used [facility] ([phone]) for oxygen.  They declined the bath chair at this time.  They have transportation home if equipment is delivered.  Confirmed with RN that they do not have oxygen at bedside.    Reached out to Sandy at [facility].  She explored coverage of walker given that it was not recommended by therapy, but their note indicates she required a handrail support for the steps she took.  Walker approved with no co-pay after confirmed use of RW for today's PT.  She additionally reports that oxygen can only be provided by [facility] given their previous rental agreement.  Reached out to [facility] and spoke to answering service, as their phones are having issues.  She requested callback to this writer ASAP.    Received callback from Chrissy at [facility]; the patient is not current with them and will have to be recertified for care despite the rental contract continuing through Oct '23.  She requests fax of information to [phone].  They will not be able to deliver today as they close at 1630, but will follow up first thing in the morning.  They will require MD completion of their certification form in addition to meeting all Medicare requirements.    Notified of recs for home health with PT/OT.  They have had home health before but are unsure of the provider; they will check records on return home and call this writer with the info for referral.  Will follow.

## 2023-08-24 NOTE — PLAN OF CARE
CHW met with patient/family at bedside. Patient experience rounding completed and reviewed the following.     Do you know your discharge plan? Yes,    If yes, what is the plan? Home     Have you discussed your needs and preferences with your SW/CM? Yes     If you are discharging home, do you have help at home? Yes    Do you think you will need help additional at home at discharge? Yes     Do you currently have difficulty keeping up with bills, affording medicine or buying food? No    Assigned SW/CM notified of any patient/family needs or concerns. Appropriate resources provided to address patient's needs.

## 2023-08-24 NOTE — PROGRESS NOTES
[facility] ([facility])  Hematology  Bone Marrow Transplant  Progress Note    Patient Name: Rosmery Ramos  Admission Date: 8/22/2023  Hospital Length of Stay: 2 days  Code Status: Full Code    Subjective:     Interval History: No acute events overnight, vital signs stable, afebrile. Patient more oriented this morning; person time and place. She remains on 2-4L NC, but desaturates to 66% on room air at rest. Bone marrow biopsy pathology pending. Will change antibiotics to levofloxacin for 5 day course to complete treatment for pneumonia. Will also order oxygen for home use. 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     Objective:     Vital Signs (Most Recent):  Temp: 98.8 °F (37.1 °C) (08/24/23 1134)  Pulse: 98 (08/24/23 1134)  Resp: 18 (08/24/23 1134)  BP: 139/63 (08/24/23 1134)  SpO2: (!) 89 % (08/24/23 1255) Vital Signs (24h Range):  Temp:  [97.3 °F (36.3 °C)-99.6 °F (37.6 °C)] 98.8 °F (37.1 °C)  Pulse:  [77-98] 98  Resp:  [17-19] 18  SpO2:  [66 %-99 %] 89 %  BP: (130-167)/(59-70) 139/63     Weight: 60.2 kg (132 lb 11.5 oz)  Body mass index is 28.72 kg/m².  Body surface area is 1.56 meters squared.      Intake/Output - Last 3 Shifts         08/22 0700 08/23 0659 08/23 0700 08/24 0659 08/24 0700 08/25 0659    P.O. 240 240 236    IV Piggyback 347.2      Total Intake(mL/kg) 587.2 (9.8) 240 (4) 236 (3.9)    Urine (mL/kg/hr) 520 1250 (0.9) 300 (0.8)    Other 0      Stool 0  0    Total Output 520 1250 300    Net +67.2 -1010 -64           Urine Occurrence 2 x      Stool Occurrence 0 x  1 x             Physical Exam  Vitals reviewed.   Constitutional:       General: She is not in acute distress.     Appearance: She is well-developed. She is ill-appearing. She is not toxic-appearing.      Comments: Cachectic    HENT:      Head: Normocephalic and atraumatic.      Right Ear: External ear normal.      Left Ear: External ear normal.      Nose: Nose normal.       Mouth/Throat:      Mouth: Mucous membranes are moist.      Pharynx: Oropharynx is clear. No oropharyngeal exudate.   Eyes:      General: No scleral icterus.     Extraocular Movements: Extraocular movements intact.      Conjunctiva/sclera: Conjunctivae normal.      Pupils: Pupils are equal, round, and reactive to light.   Neck:      Comments: Large nodule noted on right shoulder  Cardiovascular:      Rate and Rhythm: Normal rate and regular rhythm.      Heart sounds: Murmur (systolic) heard.      Comments: Pacemaker on left side of chest  Pulmonary:      Effort: Pulmonary effort is normal.      Breath sounds: Rales (bilateral lung fields) present. No wheezing.   Abdominal:      General: Bowel sounds are normal. There is no distension.      Palpations: Abdomen is soft.      Tenderness: There is no abdominal tenderness.   Musculoskeletal:         General: No deformity. Normal range of motion.      Cervical back: Normal range of motion.      Right lower leg: No edema.      Left lower leg: No edema.   Skin:     General: Skin is warm and dry.   Neurological:      Mental Status: She is alert. She is disoriented.      Comments: Possible facial drop, no other cranial nerve deficit noted            Significant Labs:   All pertinent labs from the last 24 hours have been reviewed.    Diagnostic Results:  I have reviewed all pertinent imaging results/findings within the past 24 hours.    Assessment/Plan:     * Acute leukemia  88 year old female with symptoms of generalized fatigue and worsening shortness of breath who presents as a transfer for concern of acute leukemia. Pathologist review of cbc notes relative blast count of 24% with report of large cells with blue cytoplasm and large nucleoli. Negative for Edward rods. Morphologically suggestive of monoblasts and promonocytes. Flow cytometry pending.     - cbc, cmp, mag, phos, ldh, uric acid  - Coag wnl, fibrinogen slightly elevated  - Bone marrow biopsy completed at  bedside, sent to pathology with studies pending  - Hold off on hydrea for now  - allopurinol 300 mg daily  - Plan for discharge and follow up in [place] next week for further treatment and management of her leukemia    Altered mental status  Patient with unclear baseline mentation. Daughter over the phone reports that patient has been living independently and capable of managing ADLs. However at time of exam, patient unable to answer simple questions. She is unable to repeat any medical information even with told her plan of care just prior. Mild facial drop of unknown chronicity on exam, remainder of exam grossly non focal.     - Infectious work up unremarkable, UA clear  - Concern for encephalopathy due to ongoing infection, vs underlying dementia exacerbated by infection and malignancy  - Hold off on CT head for now    Debility  - PT/OT consulted    Pneumonia due to infectious organism  Acute hypoxemic respiratory failure    Patient presented to outside hospital with hypoxemic respiratory failure with CTA showing multifocal pneumonia. Patient currently on 4-5L NC. Afebrile currently and prior to transfer. Blood cultures without growth for the last 48 hours. Patient was treated with rocephin and doxycycline on 8/20.    - Rocephin and azithromycin changed to levofloxacin for 5 days  - Repeat blood cultures, NGTD  - incentive spirometry, acapella, and duoneb  - 6 minute walk test complete, home O2 ordered        VTE Risk Mitigation (From admission, onward)           Ordered     enoxaparin injection 40 mg  Every 24 hours         08/23/23 0917     IP VTE HIGH RISK PATIENT  Once         08/22/23 1240     Place sequential compression device  Until discontinued         08/22/23 1240     Reason for No Pharmacological VTE Prophylaxis  Once        Question:  Reasons:  Answer:  Thrombocytopenia    08/22/23 1240                    Disposition: Plan for discharge today    Gagandeep Perez MD  Bone Marrow Transplant  [facility] - Oncology ([facility])

## 2023-08-24 NOTE — ASSESSMENT & PLAN NOTE
88 year old female with symptoms of generalized fatigue and worsening shortness of breath who presents as a transfer for concern of acute leukemia. Pathologist review of cbc notes relative blast count of 24% with report of large cells with blue cytoplasm and large nucleoli. Negative for Edward rods. Morphologically suggestive of monoblasts and promonocytes. Flow cytometry pending.     - cbc, cmp, mag, phos, ldh, uric acid  - Coag wnl, fibrinogen slightly elevated  - Bone marrow biopsy completed at bedside, sent to pathology with studies pending  - Hold off on hydrea for now  - allopurinol 300 mg daily  - Plan for discharge and follow up in [place] next week for further treatment and management of her leukemia

## 2023-08-24 NOTE — SUBJECTIVE & OBJECTIVE
Subjective:     Interval History: No acute events overnight, vital signs stable, afebrile. Patient more oriented this morning; person time and place. She remains on 2-4L NC, but desaturates to 65-70% on room air. Bone marrow biopsy pathology pending. Will change antibiotics to levofloxacin for 5 day course to complete treatment for pneumonia. Will also order oxygen for home use. 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     Objective:     Vital Signs (Most Recent):  Temp: 98.8 °F (37.1 °C) (08/24/23 1134)  Pulse: 98 (08/24/23 1134)  Resp: 18 (08/24/23 1134)  BP: 139/63 (08/24/23 1134)  SpO2: (!) 89 % (08/24/23 1255) Vital Signs (24h Range):  Temp:  [97.3 °F (36.3 °C)-99.6 °F (37.6 °C)] 98.8 °F (37.1 °C)  Pulse:  [77-98] 98  Resp:  [17-19] 18  SpO2:  [66 %-99 %] 89 %  BP: (130-167)/(59-70) 139/63     Weight: 60.2 kg (132 lb 11.5 oz)  Body mass index is 28.72 kg/m².  Body surface area is 1.56 meters squared.      Intake/Output - Last 3 Shifts         08/22 0700  08/23 0659 08/23 0700  08/24 0659 08/24 0700  08/25 0659    P.O. 240 240 236    IV Piggyback 347.2      Total Intake(mL/kg) 587.2 (9.8) 240 (4) 236 (3.9)    Urine (mL/kg/hr) 520 1250 (0.9) 300 (0.8)    Other 0      Stool 0  0    Total Output 520 1250 300    Net +67.2 -1010 -64           Urine Occurrence 2 x      Stool Occurrence 0 x  1 x             Physical Exam  Vitals reviewed.   Constitutional:       General: She is not in acute distress.     Appearance: She is well-developed. She is ill-appearing. She is not toxic-appearing.      Comments: Cachectic    HENT:      Head: Normocephalic and atraumatic.      Right Ear: External ear normal.      Left Ear: External ear normal.      Nose: Nose normal.      Mouth/Throat:      Mouth: Mucous membranes are moist.      Pharynx: Oropharynx is clear. No oropharyngeal exudate.   Eyes:      General: No scleral icterus.     Extraocular Movements: Extraocular  movements intact.      Conjunctiva/sclera: Conjunctivae normal.      Pupils: Pupils are equal, round, and reactive to light.   Neck:      Comments: Large nodule noted on right shoulder  Cardiovascular:      Rate and Rhythm: Normal rate and regular rhythm.      Heart sounds: Murmur (systolic) heard.      Comments: Pacemaker on left side of chest  Pulmonary:      Effort: Pulmonary effort is normal.      Breath sounds: Rales (bilateral lung fields) present. No wheezing.   Abdominal:      General: Bowel sounds are normal. There is no distension.      Palpations: Abdomen is soft.      Tenderness: There is no abdominal tenderness.   Musculoskeletal:         General: No deformity. Normal range of motion.      Cervical back: Normal range of motion.      Right lower leg: No edema.      Left lower leg: No edema.   Skin:     General: Skin is warm and dry.   Neurological:      Mental Status: She is alert. She is disoriented.      Comments: Possible facial drop, no other cranial nerve deficit noted            Significant Labs:   All pertinent labs from the last 24 hours have been reviewed.    Diagnostic Results:  I have reviewed all pertinent imaging results/findings within the past 24 hours.

## 2023-08-24 NOTE — PLAN OF CARE
[facility] (Hospital)      HOME HEALTH ORDERS  FACE TO FACE ENCOUNTER    Patient Name: Rosmery Ramos  YOB: 1935    PCP: Nuvia, Primary Doctor   PCP Address: None  PCP Phone Number: None  PCP Fax: None    Encounter Date: 8/21/23    Admit to Home Health    Diagnoses:  Active Hospital Problems    Diagnosis  POA    *Acute leukemia [C95.00]  Yes    Pneumonia due to infectious organism [J18.9]  Yes    Acute hypoxemic respiratory failure [J96.01]  Yes    Debility [R53.81]  Yes    Altered mental status [R41.82]  Yes      Resolved Hospital Problems   No resolved problems to display.       Follow Up Appointments:  No future appointments.    Allergies:  Review of patient's allergies indicates:   Allergen Reactions    Latex, natural rubber Swelling     Mild/moderate reaction per pt daughter.    Shellfish containing products     Iodine        Medications: Review discharge medications with patient and family and provide education.    Current Facility-Administered Medications   Medication Dose Route Frequency Provider Last Rate Last Admin    albuterol-ipratropium 2.5 mg-0.5 mg/3 mL nebulizer solution 3 mL  3 mL Nebulization Q4H PRN Gagandeep Perez MD        allopurinoL tablet 300 mg  300 mg Oral Daily Gagandeep Perez MD   300 mg at 08/24/23 0922    azithromycin tablet 500 mg  500 mg Oral Q24H Kendall Hoffmann MD   500 mg at 08/23/23 1845    dextrose 10% bolus 125 mL 125 mL  12.5 g Intravenous PRN Monica Day, NP        dextrose 10% bolus 250 mL 250 mL  25 g Intravenous PRN Monica Day, NP        enoxaparin injection 40 mg  40 mg Subcutaneous Q24H (prophylaxis, 1700) Gagandeep Perez MD   40 mg at 08/23/23 1846    glucagon (human recombinant) injection 1 mg  1 mg Intramuscular PRN Monica Day, NP        glucose chewable tablet 16 g  16 g Oral PRN Monica Day, NP        glucose chewable tablet 24 g  24 g Oral PRN Monica Day, NP        levoFLOXacin tablet  750 mg  750 mg Oral Daily Katelyn Sood, NP   750 mg at 08/24/23 1056    mupirocin 2 % ointment   Nasal BID Kendall Hoffmann MD   Given at 08/24/23 0922    naloxone 0.4 mg/mL injection 0.02 mg  0.02 mg Intravenous PRN Monica Day, NP        sodium chloride 0.9% flush 10 mL  10 mL Intravenous Q12H PRN Monica Day NP         Current Discharge Medication List        START taking these medications    Details   allopurinoL (ZYLOPRIM) 300 MG tablet Take 1 tablet (300 mg total) by mouth once daily.  Qty: 30 tablet, Refills: 0      levoFLOXacin (LEVAQUIN) 750 MG tablet Take 1 tablet (750 mg total) by mouth once daily. for 5 days  Qty: 5 tablet, Refills: 0           CONTINUE these medications which have NOT CHANGED    Details   aspirin (ECOTRIN) 81 MG EC tablet Take 81 mg by mouth once daily.      losartan (COZAAR) 50 MG tablet Take 50 mg by mouth once daily.               I have seen and examined this patient within the last 30 days. My clinical findings that support the need for the home health skilled services and home bound status are the following:no   Weakness/numbness causing balance and gait disturbance due to Weakness/Debility and Malignancy/Cancer making it taxing to leave home.  Requiring assistive device to leave home due to unsteady gait caused by  Weakness/Debility and Malignancy/Cancer.     Diet:   regular diet    Labs:  Report Lab results to primary oncologist     Referrals/ Consults  Physical Therapy to evaluate and treat. Evaluate for home safety and equipment needs; Establish/upgrade home exercise program. Perform / instruct on therapeutic exercises, gait training, transfer training, and Range of Motion.    Activities:   activity as tolerated    Nursing:   Agency to admit patient within 24 hours of hospital discharge unless specified on physician order or at patient request    SN to complete comprehensive assessment including routine vital signs. Instruct on disease process and  s/s of complications to report to MD. Review/verify medication list sent home with the patient at time of discharge  and instruct patient/caregiver as needed. Frequency may be adjusted depending on start of care date.     Skilled nurse to perform up to 3 visits PRN for symptoms related to diagnosis    Notify MD if SBP > 160 or < 90; DBP > 90 or < 50; HR > 120 or < 50; Temp > 101; O2 < 88%    Ok to schedule additional visits based on staff availability and patient request on consecutive days within the home health episode.    When multiple disciplines ordered:    Start of Care occurs on Sunday - Wednesday schedule remaining discipline evaluations as ordered on separate consecutive days following the start of care.    Thursday SOC -schedule subsequent evaluations Friday and Monday the following week.     Friday - Saturday SOC - schedule subsequent discipline evaluations on consecutive days starting Monday of the following week.    For all post-discharge communication and subsequent orders please contact patient's primary oncologist    Miscellaneous   Routine Skin for Bedridden Patients: Instruct patient/caregiver to apply moisture barrier cream to all skin folds and wet areas in perineal area daily and after baths and all bowel movements.    Home Health Aide:  Physical Therapy Other: per protocol     Wound Care Orders  no    I certify that this patient is confined to her home and needs physical therapy.

## 2023-08-24 NOTE — PT/OT/SLP EVAL
Physical Therapy Evaluation and Discharge Note    Patient Name:  Rosmery Ramos   MRN:  [id number]    Recommendations:     Discharge Recommendations: other (see comments)  Discharge Equipment Recommendations: walker, rolling   Barriers to discharge: None    Assessment:     Rosmery Ramos is a 88 y.o. female admitted with a medical diagnosis of Acute leukemia. .  At this time, patient is progressing towards their prior level of function. Pt. scheduled for hospital discharge today and does not require further acute PT services.     Recent Surgery: * No surgery found *      Plan:     During this hospitalization, patient does not require further acute PT services.  Please re-consult if situation changes.      Subjective     Chief Complaint: no specific c/o  Patient/Family Comments/goals: pt. Agreeable to PT  Pain/Comfort:  Pain Rating 1: 0/10  Pain Rating Post-Intervention 1: 0/10    Patients cultural, spiritual, Nondenominational conflicts given the current situation: no    Living Environment:  Pt. Lives alone in [facility] with no GISELLE.  Prior to admission, patients level of function was indep.  Equipment used at home: none.  Upon discharge, patient will have assistance from family.    Objective:     Communicated with nursing prior to session.  Patient found supine with peripheral IV, PureWick, bed alarm, oxygen upon PT entry to room.    General Precautions: Standard, fall    Orthopedic Precautions:N/A   Braces: N/A  Respiratory Status: Nasal cannula, flow 4 L/min    Exams:  RLE ROM: WFL  RLE Strength: WFL  LLE ROM: WFL  LLE Strength: WFL    Functional Mobility:  Bed Mobility:     Rolling Left:  stand by assistance  Scooting: stand by assistance  Supine to Sit: stand by assistance  Transfers:     Sit to Stand:  contact guard assistance with rolling walker  Gait: 40' with RW and CGA-Min A for RW management. Pt. amb. with decreased step length/collette.  Balance: fair    AM-PAC 6 CLICK MOBILITY  Total Score:18       Treatment and  Education:  Discussed therapy/DME needs, goals, and POC.    AM-PAC 6 CLICK MOBILITY  Total Score:18     Patient left up in chair with all lines intact and call button in reach.    GOALS:   Multidisciplinary Problems       Physical Therapy Goals       Not on file              Multidisciplinary Problems (Resolved)          Problem: Physical Therapy    Goal Priority Disciplines Outcome Goal Variances Interventions   Physical Therapy Goal   (Resolved)     PT, PT/OT Met                         History:     Past Medical History:   Diagnosis Date    Anemia, unspecified     Asthma     CHF (congestive heart failure)        History reviewed. No pertinent surgical history.    Time Tracking:     PT Received On: 08/24/23  PT Start Time: 1040     PT Stop Time: 1100  PT Total Time (min): 20 min     Billable Minutes: Evaluation 12 and Gait Training 8      08/24/2023

## 2023-08-25 VITALS
SYSTOLIC BLOOD PRESSURE: 110 MMHG | TEMPERATURE: 98 F | DIASTOLIC BLOOD PRESSURE: 53 MMHG | WEIGHT: 132.69 LBS | RESPIRATION RATE: 20 BRPM | HEART RATE: 89 BPM | OXYGEN SATURATION: 96 % | BODY MASS INDEX: 28.63 KG/M2 | HEIGHT: 57 IN

## 2023-08-25 LAB
ABO + RH BLD: NORMAL
ALBUMIN SERPL BCP-MCNC: 1.9 G/DL (ref 3.5–5.2)
ALP SERPL-CCNC: 74 U/L (ref 55–135)
ALT SERPL W/O P-5'-P-CCNC: 8 U/L (ref 10–44)
ANION GAP SERPL CALC-SCNC: 5 MMOL/L (ref 8–16)
ANISOCYTOSIS BLD QL SMEAR: SLIGHT
AST SERPL-CCNC: 23 U/L (ref 10–40)
BASOPHILS NFR BLD: 0 % (ref 0–1.9)
BILIRUB SERPL-MCNC: 0.3 MG/DL (ref 0.1–1)
BLD GP AB SCN CELLS X3 SERPL QL: NORMAL
BUN SERPL-MCNC: 14 MG/DL (ref 8–23)
CALCIUM SERPL-MCNC: 8.5 MG/DL (ref 8.7–10.5)
CHLORIDE SERPL-SCNC: 96 MMOL/L (ref 95–110)
CO2 SERPL-SCNC: 38 MMOL/L (ref 23–29)
CREAT SERPL-MCNC: 0.6 MG/DL (ref 0.5–1.4)
DIFFERENTIAL METHOD: ABNORMAL
EOSINOPHIL NFR BLD: 0 % (ref 0–8)
ERYTHROCYTE [DISTWIDTH] IN BLOOD BY AUTOMATED COUNT: 14.9 % (ref 11.5–14.5)
EST. GFR  (NO RACE VARIABLE): >60 ML/MIN/1.73 M^2
GLUCOSE SERPL-MCNC: 88 MG/DL (ref 70–110)
HCT VFR BLD AUTO: 32.7 % (ref 37–48.5)
HGB BLD-MCNC: 9.8 G/DL (ref 12–16)
HYPOCHROMIA BLD QL SMEAR: ABNORMAL
IMM GRANULOCYTES # BLD AUTO: ABNORMAL K/UL (ref 0–0.04)
IMM GRANULOCYTES NFR BLD AUTO: ABNORMAL % (ref 0–0.5)
LDH SERPL L TO P-CCNC: 251 U/L (ref 110–260)
LYMPHOCYTES NFR BLD: 25 % (ref 18–48)
MAGNESIUM SERPL-MCNC: 2.1 MG/DL (ref 1.6–2.6)
MCH RBC QN AUTO: 28.5 PG (ref 27–31)
MCHC RBC AUTO-ENTMCNC: 30 G/DL (ref 32–36)
MCV RBC AUTO: 95 FL (ref 82–98)
MONOCYTES NFR BLD: 2 % (ref 4–15)
NEUTROPHILS NFR BLD: 27 % (ref 38–73)
NRBC BLD-RTO: 0 /100 WBC
OVALOCYTES BLD QL SMEAR: ABNORMAL
PHOSPHATE SERPL-MCNC: 2.8 MG/DL (ref 2.7–4.5)
PLATELET # BLD AUTO: 210 K/UL (ref 150–450)
PLATELET BLD QL SMEAR: ABNORMAL
PMV BLD AUTO: 9.8 FL (ref 9.2–12.9)
POIKILOCYTOSIS BLD QL SMEAR: SLIGHT
POLYCHROMASIA BLD QL SMEAR: ABNORMAL
POTASSIUM SERPL-SCNC: 4.8 MMOL/L (ref 3.5–5.1)
PROT SERPL-MCNC: 5.3 G/DL (ref 6–8.4)
RBC # BLD AUTO: 3.44 M/UL (ref 4–5.4)
SODIUM SERPL-SCNC: 139 MMOL/L (ref 136–145)
SPECIMEN OUTDATE: NORMAL
SPHEROCYTES BLD QL SMEAR: ABNORMAL
URATE SERPL-MCNC: 4.4 MG/DL (ref 2.4–5.7)
WBC # BLD AUTO: 18.69 K/UL (ref 3.9–12.7)
WBC OTHER NFR BLD MANUAL: 46 %

## 2023-08-25 PROCEDURE — 83735 ASSAY OF MAGNESIUM: CPT | Performed by: NURSE PRACTITIONER

## 2023-08-25 PROCEDURE — 99238 PR HOSPITAL DISCHARGE DAY,<30 MIN: ICD-10-PCS | Mod: GC,,, | Performed by: INTERNAL MEDICINE

## 2023-08-25 PROCEDURE — 80053 COMPREHEN METABOLIC PANEL: CPT | Performed by: NURSE PRACTITIONER

## 2023-08-25 PROCEDURE — 99238 HOSP IP/OBS DSCHRG MGMT 30/<: CPT | Mod: GC,,, | Performed by: INTERNAL MEDICINE

## 2023-08-25 PROCEDURE — 25000003 PHARM REV CODE 250: Performed by: NURSE PRACTITIONER

## 2023-08-25 PROCEDURE — 86900 BLOOD TYPING SEROLOGIC ABO: CPT | Performed by: NURSE PRACTITIONER

## 2023-08-25 PROCEDURE — 85007 BL SMEAR W/DIFF WBC COUNT: CPT | Performed by: NURSE PRACTITIONER

## 2023-08-25 PROCEDURE — 83615 LACTATE (LD) (LDH) ENZYME: CPT | Performed by: NURSE PRACTITIONER

## 2023-08-25 PROCEDURE — 97535 SELF CARE MNGMENT TRAINING: CPT

## 2023-08-25 PROCEDURE — 25000003 PHARM REV CODE 250: Performed by: STUDENT IN AN ORGANIZED HEALTH CARE EDUCATION/TRAINING PROGRAM

## 2023-08-25 PROCEDURE — 84100 ASSAY OF PHOSPHORUS: CPT | Performed by: NURSE PRACTITIONER

## 2023-08-25 PROCEDURE — 85027 COMPLETE CBC AUTOMATED: CPT | Performed by: NURSE PRACTITIONER

## 2023-08-25 PROCEDURE — 84550 ASSAY OF BLOOD/URIC ACID: CPT | Performed by: NURSE PRACTITIONER

## 2023-08-25 PROCEDURE — 36415 COLL VENOUS BLD VENIPUNCTURE: CPT | Performed by: NURSE PRACTITIONER

## 2023-08-25 RX ADMIN — LEVOFLOXACIN 750 MG: 750 TABLET, FILM COATED ORAL at 09:08

## 2023-08-25 RX ADMIN — MUPIROCIN: 20 OINTMENT TOPICAL at 09:08

## 2023-08-25 RX ADMIN — ALLOPURINOL 300 MG: 300 TABLET ORAL at 09:08

## 2023-08-25 NOTE — ASSESSMENT & PLAN NOTE
88 year old female with symptoms of generalized fatigue and worsening shortness of breath who presents as a transfer for concern of acute leukemia. Pathologist review of cbc notes relative blast count of 24% with report of large cells with blue cytoplasm and large nucleoli. Negative for Edward rods. Morphologically suggestive of monoblasts and promonocytes. Flow cytometry pending.     - cbc, cmp, mag, phos, ldh, uric acid  - Coag wnl, fibrinogen slightly elevated  - Bone marrow biopsy completed at bedside, sent to pathology with studies pending  - Hold off on hydrea for now  - allopurinol 300 mg daily  - Plan for discharge and follow up in [place] next week for further treatment and management of her leukemia

## 2023-08-25 NOTE — NURSING
Alert and orientedx2, daughters at bedside, reviewed AVS at bedside with patient and family, removed IV, answered all questions, verbalized understanding, informed that home oxygen has been delivered, MSW made aware, transportation ordered,

## 2023-08-25 NOTE — PROGRESS NOTES
Notified by daughter that oxygen had been delivered to the home.  Notified by FRANCISCA Beach that medications received at bedside,  Notified by Marjorie at [facility] that RW delivered to bedside.  Family are unable to bring oxygen for ride home.  Confirmed with Rambo in transfer center that ambulance could provide oxygen for ride home.  Sent request to [facility] transfer center for ambulance ride home with 4L O2 for 1530 pickup; awaiting response.  Notified [facility] of plan.  Safe discharge plan in place.  Pickup currently estimated for 1850; alerted on-call Gabbie Reyes.  Will follow.

## 2023-08-25 NOTE — PT/OT/SLP PROGRESS
Occupational Therapy Treatment    Patient Name:  Rosmery Ramos   MRN:  [id number]  Admit Date: 8/22/2023  Admitting Diagnosis:  Acute leukemia   Length of Stay: 3 days  Recent Surgery: * No surgery found *      Recommendations:     Discharge Recommendations: other (see comments)  Discharge Equipment Recommendations:  walker, rolling  Barriers to discharge:  None    Plan:     Patient to be seen 3 x/week to address the above listed problems via self-care/home management, therapeutic activities, therapeutic exercises  Plan of Care Expires: 09/22/23  Plan of Care Reviewed with: patient, daughter    Assessment:   Rosmery Ramos is a 88 y.o. female with a medical diagnosis of Acute leukemia.  She presents with the following performance deficits affecting function: weakness, impaired endurance, impaired self care skills, impaired functional mobility, gait instability, impaired balance, decreased safety awareness, impaired cardiopulmonary response to activity, decreased lower extremity function, decreased upper extremity function, impaired coordination.      Pt tolerated session well this date and was willing to participate. Demonstrating continued increased weakness, impaired endurance, increased fatigue, impaired balance and decreased safety awareness, requiring increased time and assistance to complete functional tasks. Patient completed extended grooming/hygiene tasks in stance at sink. Patient is progressing towards established goals, and continues to benefit from acute skilled OT services to increase functional performance and improve quality of life. OT to continue to recommend other post acute skilled therapy services at discharge to improve pt functional independence and increase patient safety before returning home.      Rehab Prognosis: Good; patient would benefit from acute skilled OT services to address these deficits and reach maximum level of function.        Subjective   Communicated with: Nurse prior to  "session.  Patient found HOB elevated with peripheral IV, PureWick, bed alarm, oxygen upon OT entry to room. Pt agreeable to participate at this time.    Patient: " I guess so "    Pain/Comfort:  Pain Rating 1: 0/10  Pain Rating Post-Intervention 1: 0/10    Objective:   Patient found with: peripheral IV, PureWick, bed alarm, oxygen   General Precautions: Standard, Cardiac fall   Orthopedic Precautions:N/A   Braces: N/A   Oxygen Device: Nasal Cannula 6L  Vitals: BP (!) 109/54 (BP Location: Right arm, Patient Position: Sitting)   Pulse 90   Temp 98.4 °F (36.9 °C) (Oral)   Resp 20   Ht 4' 9" (1.448 m)   Wt 60.2 kg (132 lb 11.5 oz)   SpO2 100%   Breastfeeding No   BMI 28.72 kg/m²     Outcome Measures:  [facility] 6 Click ADL: 19    Cognition:   Alert and Cooperative  Command following: follows one-step commands  Communication: clear/fluent; low speech    Occupational Performance:  Bed Mobility:    Patient completed Rolling/Turning to Left with  stand by assistance and increased time; HOB elevated  Patient completed Supine to Sit with stand by assistance on L side of bed; increased time and HOB elevated  Scooting anteriorly to EOB to have both feet planted on floor: stand by assistance    Functional Mobility/Transfers:  Static Sitting EOB: SBA  Patient completed Sit <> Stand Transfer from EOB with contact guard assistance  with  rolling walker   Patient completed stand from toilet with CGA using RW  Static Standing Balance: SBA  Dynamic Standing Balance: SBA  Patient completed Toilet Transfer Step Transfer technique with contact guard assistance with  rolling walker  Patient completed functional mobility of household distance ~20ft with CGA using RW.      Activities of Daily Living:  Grooming: stand by assistance to perform extended oral care and wash face and B hands in stance at sink; slight assistance with object finding  Lower Body Dressing: maximal assistance to doff/don clean adult brief  Toileting: maximal " assistance to perform pericare following voiding at toilet level    [facility] 6 Click ADL:  [facility] Total Score: 19    Treatment & Education:  -Pt and daughter education on OT role and POC.  -Importance of E/OOB activity with staff assistance, emphasis on daily participation  -Safety during functional transfer and mobility ensured  -Patient provided with education on importance of Bilateral UB/LB integration during functional tasks for improvement in functional performance.   -Education provided/reviewed, questions answered within OT scope of practice.   -Patient demonstrates understanding and learning this date.         Patient left up in chair with all lines intact, call button in reach, and daughter and MD present    GOALS:   Multidisciplinary Problems       Occupational Therapy Goals          Problem: Occupational Therapy    Goal Priority Disciplines Outcome Interventions   Occupational Therapy Goal     OT, PT/OT Ongoing, Progressing    Description: Goals to be met by: 9/13/2023     Patient will increase functional independence with ADLs by performing:    Feeding with Supervision.  UE Dressing with Stand-by Assistance.  LE Dressing with Stand-by Assistance.  Grooming while standing at sink with Stand-by Assistance.  Toileting from toilet with Stand-by Assistance for hygiene and clothing management.   Toilet transfer to toilet with Stand-by Assistance.                         Time Tracking:     OT Date of Treatment: 08/25/23  OT Start Time: 0906  OT Stop Time: 0953  OT Total Time (min): 47 min    Billable Minutes:Self Care/Home Management 47      8/25/2023

## 2023-08-25 NOTE — PLAN OF CARE
No acute events this shift. Patient AAOx4, although confused. Delayed responses. Afebrile and VSS. No complaints of pain or nausea. Pure Wick in place. Radha care performed. Absorbant pad changed. Turned q2. Plan to discharge patient on 8/25. Daughter remains at bedside. Bed alarm on. Bed in lowest position and locked. Side rails up x2. All possessions and call light within reach. Non-skid socks on. Instructed to call for assistance and voiced understanding. All needs of patient currently met. Will continue to monitor with frequent rounding.

## 2023-08-25 NOTE — PROGRESS NOTES
Received CMN form from [facility] ([phone]); faxed with MD signature as requested.  Spoke to Chrissy, who reports they are unable to deliver across state lines; provided contact info for family to arrange pickup from store or home during concentrator setup.  Another family member is already at the home to receive equipment;  Chrissy estimates delivery and setup will be complete by 1430.  She also requests an update qualifying saturation.    [facility] confirmed plan for discharge today; RW will be delivered to bedside shortly.    Daughter requests referral to [facility] ([phone]).  Referral sent via [facility]; awaiting response.  Will follow.

## 2023-08-25 NOTE — DISCHARGE SUMMARY
[facility] - Oncology ([facility])  Hematology  Bone Marrow Transplant  Discharge Summary      Patient Name: Rosmery Ramos  MRN: [id number]  Admission Date: 8/22/2023  Hospital Length of Stay: 3 days  Discharge Date and Time:  08/25/2023 3:20 PM  Attending Physician: Obdulio Savage MD   Discharging Provider: Gagandeep Perez MD  Primary Care Provider: Nuvia, Primary Doctor    HPI: Patient is an 88 year old female with listed past medical history of pacemaker placement, history of COVID19 infection, history of PE (not currently on anticoagulation) who presented to outside hospital on 8/19 with shortness of breath. Patient is a poor historian and it is unclear what her baseline mental status is as family reports her as high functioning but she is unable to answer simple questions at bedside; history gathered with discussion with daughter over the phone and grand daughter at bedside. Daughter reports that patient was in her normal state of health until about 1 week ago when family was reporting that patient was not quite herself; she is unable to elaborate further in this regard. Patient was taken to the ED by her daughter because she felt that the patient looked off color and was having mucus production and shortness of breath. Outside hospital records not that she had O2 saturations as low as 40s and that she was saturating at 90s with 4L NC. She had CT chest with multifocal pneumonia and bilateral pleural effusions. She was treated with rocephin, doxycycline, and lasix. Patient was found to have leukocytosis of 50k. Pathologists review of cbc notes Large cells with blue cytoplasm and large nucleoli, negative for mikie rods, morphologically suggestive of monoblasts and promonocytes. Flow cytometry pending. Family at bedside report that she presented to the hospital 2 years ago with similar presentation and that she was noted to have significantly elevated WBC count at this time as well. They also note history of cancer  in the family but are unable to elaborate further.       * No surgery found *     Hospital Course: 08/23/2023 No acute events overnight, vital signs stable. She remains afebrile. Alert and oriented x1 this morning, same as yesterday. Bone marrow biopsy results pending. Had long discussion with patient and daughter at bedside. We explained that the concern is for an acute leukemia but given the patient's advanced age and co-morbidities she would not be a candidate for intensive induction or transplant. We explained that more tolerable regimens are available that have the potential to provide meaningful time and quality of life. Daughter expresses that she agrees that family is not on board for intensive treatment. We will receive preliminary information on her recent bone marrow in the next 1-2 days and discuss overall treatment plan prior to discharge.   08/24/2023 No acute events overnight, vital signs stable, afebrile. Patient more oriented this morning; person time and place. She remains on 2-4L NC, but desaturates to 65-70% on room air. Bone marrow biopsy pathology pending. Will change antibiotics to levofloxacin for 5 day course to complete treatment for pneumonia. Will also order oxygen for home use. Plan for follow up in [place] early next week for further management/treatment of her leukemia.   08/25/2023 No acute events overnight, afebrile. Vital signs stable but patient remains on 4L nasal cannula to saturate at 95%. Patient desaturates to 66% on room air at rest. She returns to 95% with 4L O2 on nasal cannula. 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 Patient to be discharged home with daughter and has follow up with Dr. Neri in [place] on 8/30.           Vital Signs (Most Recent):  Temp: 98.4 °F (36.9 °C) (08/25/23 1204)  Pulse: 90 (08/25/23 1204)  Resp: 20 (08/25/23 1204)  BP: (!) 109/54 (08/25/23 1204)  SpO2: 100 % (08/25/23 1204) Vital  Signs (24h Range):  Temp:  [98.2 °F (36.8 °C)-99.1 °F (37.3 °C)] 98.4 °F (36.9 °C)  Pulse:  [69-97] 90  Resp:  [14-20] 20  SpO2:  [91 %-100 %] 100 %  BP: (109-143)/(54-65) 109/54     Weight: 60.2 kg (132 lb 11.5 oz)  Body mass index is 28.72 kg/m².  Body surface area is 1.56 meters squared.      Intake/Output - Last 3 Shifts         08/23 0700  08/24 0659 08/24 0700  08/25 0659 08/25 0700  08/26 0659    P.O. 240 710     IV Piggyback       Total Intake(mL/kg) 240 (4) 710 (11.8)     Urine (mL/kg/hr) 1250 (0.9) 825 (0.6)     Other       Stool  0     Total Output 1250 825     Net -1010 -115            Urine Occurrence  2 x     Stool Occurrence  1 x              Physical Exam  Vitals reviewed.   Constitutional:       General: She is not in acute distress.     Appearance: She is well-developed. She is ill-appearing. She is not toxic-appearing.      Comments: Cachectic    HENT:      Head: Normocephalic and atraumatic.      Right Ear: External ear normal.      Left Ear: External ear normal.      Nose: Nose normal.      Mouth/Throat:      Mouth: Mucous membranes are moist.      Pharynx: Oropharynx is clear. No oropharyngeal exudate.   Eyes:      General: No scleral icterus.     Extraocular Movements: Extraocular movements intact.      Conjunctiva/sclera: Conjunctivae normal.      Pupils: Pupils are equal, round, and reactive to light.   Neck:      Comments: Large nodule noted on right shoulder  Cardiovascular:      Rate and Rhythm: Normal rate and regular rhythm.      Heart sounds: Murmur (systolic) heard.      Comments: Pacemaker on left side of chest  Pulmonary:      Effort: Pulmonary effort is normal.      Breath sounds: Rales (bilateral lung fields) present. No wheezing.      Comments: 4 L NC  Abdominal:      General: Bowel sounds are normal. There is no distension.      Palpations: Abdomen is soft.      Tenderness: There is no abdominal tenderness.   Musculoskeletal:         General: No deformity. Normal range of  motion.      Cervical back: Normal range of motion.      Right lower leg: No edema.      Left lower leg: No edema.   Skin:     General: Skin is warm and dry.   Neurological:      Mental Status: She is alert. She is disoriented.      Comments: Possible facial drop, no other cranial nerve deficit noted     * Acute leukemia  88 year old female with symptoms of generalized fatigue and worsening shortness of breath who presents as a transfer for concern of acute leukemia. Pathologist review of cbc notes relative blast count of 24% with report of large cells with blue cytoplasm and large nucleoli. Negative for Edward rods. Morphologically suggestive of monoblasts and promonocytes. Flow cytometry pending.      - cbc, cmp, mag, phos, ldh, uric acid  - Coag wnl, fibrinogen slightly elevated  - Bone marrow biopsy completed at bedside, sent to pathology with studies pending. 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 Daughter over the phone reports that patient has been living independently and capable of managing ADLs. However at time of exam, patient unable to answer simple questions. She is unable to repeat any medical information even with told her plan of care just prior. Mild facial drop of unknown chronicity on exam, remainder of exam grossly non focal.      - Infectious work up unremarkable, UA clear  - Concern for encephalopathy due to ongoing infection, vs underlying dementia exacerbated by infection and malignancy  - Hold off on CT head for now     Debility  - PT/OT consulted     Pneumonia due to infectious organism  Acute hypoxemic respiratory failure     Patient presented to outside hospital with hypoxemic respiratory failure with CTA showing multifocal pneumonia. Patient  currently on 4-5L NC. Afebrile currently and prior to transfer. Blood cultures without growth for the last 48 hours. Patient was treated with rocephin and doxycycline on 8/20.     - Rocephin and azithromycin changed to levofloxacin for 5 days  - Repeat blood cultures, NGTD  - incentive spirometry, acapella, and duoneb  - 6 minute walk test complete, home O2 ordered    Goals of Care Treatment Preferences:  Code Status: Full Code      Significant Diagnostic Studies: Labs:   CMP   Recent Labs   Lab 08/24/23  0416 08/25/23  0343    139   K 4.8 4.8   CL 97 96   CO2 39* 38*   GLU 91 88   BUN 20 14   CREATININE 0.6 0.6   CALCIUM 8.8 8.5*   PROT 5.7* 5.3*   ALBUMIN 2.1* 1.9*   BILITOT 0.3 0.3   ALKPHOS 87 74   AST 21 23   ALT 8* 8*   ANIONGAP 4* 5*   , CBC   Recent Labs   Lab 08/24/23 0416 08/25/23  0343   WBC 22.82* 18.69*   HGB 10.7* 9.8*   HCT 35.5* 32.7*    210    and INR   Lab Results   Component Value Date    INR 1.0 08/23/2023       Pending Diagnostic Studies:       Procedure Component Value Units Date/Time    Specimen to Pathology, Bone Marrow Aspiration/Biopsy [[id number]] Collected: 08/22/23 1442    Order Status: Sent Lab Status: In process Updated: 08/22/23 1550    Specimen: Bone Marrow           Final Active Diagnoses:    Diagnosis Date Noted POA    PRINCIPAL PROBLEM:  Acute leukemia [C95.00] 08/22/2023 Yes    Pneumonia due to infectious organism [J18.9] 08/22/2023 Yes    Acute hypoxemic respiratory failure [J96.01] 08/22/2023 Yes    Debility [R53.81] 08/22/2023 Yes    Altered mental status [R41.82] 08/22/2023 Yes      Problems Resolved During this Admission:      Discharged Condition: stable    Disposition: Home or Self Care    Follow Up:    Patient Instructions:      OXYGEN FOR HOME USE     Order Specific Question Answer Comments   Liter Flow 4    Duration Continuous    Qualifying Test Performed at: Rest    Oxygen saturation: 66    Portable mode: continuous    Route nasal cannula    Device: home  "concentrator with portable tanks    Length of need (in months): 3 mos    Patient condition with qualifying saturation Other - List qualifying diagnosis and code    Select a diagnosis & list the code in the comments Pneumonia [239590]    Height: 4' 9" (1.448 m)    Weight: 60.2 kg (132 lb 11.5 oz)    Alternative treatment measures have been tried or considered and deemed clinically ineffective. Yes      BATH/SHOWER CHAIR FOR HOME USE     Order Specific Question Answer Comments   Height: 4' 9" (1.448 m)    Weight: 60.2 kg (132 lb 11.5 oz)    Does patient have medical equipment at home? none    Length of need (1-99 months): 24    Type: With back      WALKER FOR HOME USE     Order Specific Question Answer Comments   Type of Walker: Fermin (4'4"-5'6")    With wheels? Yes    Height: 4' 9" (1.448 m)    Weight: 60.2 kg (132 lb 11.5 oz)    Length of need (1-99 months): 99    Does patient have medical equipment at home? none    Please check all that apply: Patient's condition impairs ambulation.    Please check all that apply: Patient is unable to safely ambulate without equipment.      Notify your health care provider if you experience any of the following:  temperature >100.4     Notify your health care provider if you experience any of the following:  persistent nausea and vomiting or diarrhea     Notify your health care provider if you experience any of the following:  severe uncontrolled pain     Notify your health care provider if you experience any of the following:  redness, tenderness, or signs of infection (pain, swelling, redness, odor or green/yellow discharge around incision site)     Notify your health care provider if you experience any of the following:  difficulty breathing or increased cough     Notify your health care provider if you experience any of the following:  severe persistent headache     Notify your health care provider if you experience any of the following:  persistent dizziness, " light-headedness, or visual disturbances     Notify your health care provider if you experience any of the following:  increased confusion or weakness     Activity as tolerated     Medications:  Reconciled Home Medications:      Medication List        START taking these medications      allopurinoL 300 MG tablet  Commonly known as: ZYLOPRIM  Take 1 tablet (300 mg total) by mouth once daily.     levoFLOXacin 750 MG tablet  Commonly known as: LEVAQUIN  Take 1 tablet (750 mg total) by mouth once daily. for 5 days            CONTINUE taking these medications      aspirin 81 MG EC tablet  Commonly known as: ECOTRIN  Take 81 mg by mouth once daily.     losartan 50 MG tablet  Commonly known as: COZAAR  Take 50 mg by mouth once daily.              Gagandeep Perez MD  Bone Marrow Transplant  [facility] - Oncology ([facility])

## 2023-08-25 NOTE — PLAN OF CARE
Problem: Adult Inpatient Plan of Care  Goal: Patient-Specific Goal (Individualized)  Outcome: Ongoing, Progressing  Goal: Readiness for Transition of Care  Outcome: Ongoing, Progressing     Problem: Infection (Pneumonia)  Goal: Resolution of Infection Signs and Symptoms  Outcome: Ongoing, Progressing     Problem: Respiratory Compromise (Pneumonia)  Goal: Effective Oxygenation and Ventilation  Outcome: Ongoing, Progressing

## 2023-08-25 NOTE — SUBJECTIVE & OBJECTIVE
Subjective:     Interval History:     Objective:     Vital Signs (Most Recent):  Temp: 98.4 °F (36.9 °C) (08/25/23 1204)  Pulse: 90 (08/25/23 1204)  Resp: 20 (08/25/23 1204)  BP: (!) 109/54 (08/25/23 1204)  SpO2: 100 % (08/25/23 1204) Vital Signs (24h Range):  Temp:  [98.2 °F (36.8 °C)-99.1 °F (37.3 °C)] 98.4 °F (36.9 °C)  Pulse:  [69-97] 90  Resp:  [14-20] 20  SpO2:  [91 %-100 %] 100 %  BP: (109-143)/(54-65) 109/54     Weight: 60.2 kg (132 lb 11.5 oz)  Body mass index is 28.72 kg/m².  Body surface area is 1.56 meters squared.      Intake/Output - Last 3 Shifts         08/23 0700  08/24 0659 08/24 0700  08/25 0659 08/25 0700  08/26 0659    P.O. 240 710     IV Piggyback       Total Intake(mL/kg) 240 (4) 710 (11.8)     Urine (mL/kg/hr) 1250 (0.9) 825 (0.6)     Other       Stool  0     Total Output 1250 825     Net -1010 -115            Urine Occurrence  2 x     Stool Occurrence  1 x              Physical Exam  Vitals reviewed.   Constitutional:       General: She is not in acute distress.     Appearance: She is well-developed. She is ill-appearing. She is not toxic-appearing.      Comments: Cachectic    HENT:      Head: Normocephalic and atraumatic.      Right Ear: External ear normal.      Left Ear: External ear normal.      Nose: Nose normal.      Mouth/Throat:      Mouth: Mucous membranes are moist.      Pharynx: Oropharynx is clear. No oropharyngeal exudate.   Eyes:      General: No scleral icterus.     Extraocular Movements: Extraocular movements intact.      Conjunctiva/sclera: Conjunctivae normal.      Pupils: Pupils are equal, round, and reactive to light.   Neck:      Comments: Large nodule noted on right shoulder  Cardiovascular:      Rate and Rhythm: Normal rate and regular rhythm.      Heart sounds: Murmur (systolic) heard.      Comments: Pacemaker on left side of chest  Pulmonary:      Effort: Pulmonary effort is normal.      Breath sounds: Rales (bilateral lung fields) present. No wheezing.       Comments: 4 L NC  Abdominal:      General: Bowel sounds are normal. There is no distension.      Palpations: Abdomen is soft.      Tenderness: There is no abdominal tenderness.   Musculoskeletal:         General: No deformity. Normal range of motion.      Cervical back: Normal range of motion.      Right lower leg: No edema.      Left lower leg: No edema.   Skin:     General: Skin is warm and dry.   Neurological:      Mental Status: She is alert. She is disoriented.      Comments: Possible facial drop, no other cranial nerve deficit noted            Significant Labs:   All pertinent labs from the last 24 hours have been reviewed.    Diagnostic Results:  I have reviewed all pertinent imaging results/findings within the past 24 hours.

## 2023-08-25 NOTE — PLAN OF CARE
Problem: Adult Inpatient Plan of Care  Goal: Plan of Care Review  Outcome: Met  Goal: Patient-Specific Goal (Individualized)  8/25/2023 1411 by Baylee Camilo RN  Outcome: Met  8/25/2023 1238 by Baylee Camilo RN  Outcome: Ongoing, Progressing  Goal: Absence of Hospital-Acquired Illness or Injury  Outcome: Met  Goal: Optimal Comfort and Wellbeing  Outcome: Met  Goal: Readiness for Transition of Care  8/25/2023 1411 by Baylee Camilo RN  Outcome: Met  8/25/2023 1238 by Baylee Camilo RN  Outcome: Ongoing, Progressing     Problem: Skin Injury Risk Increased  Goal: Skin Health and Integrity  Outcome: Met     Problem: Fluid Imbalance (Pneumonia)  Goal: Fluid Balance  Outcome: Met     Problem: Infection (Pneumonia)  Goal: Resolution of Infection Signs and Symptoms  8/25/2023 1411 by Baylee Camilo RN  Outcome: Met  8/25/2023 1238 by Baylee Camilo RN  Outcome: Ongoing, Progressing     Problem: Respiratory Compromise (Pneumonia)  Goal: Effective Oxygenation and Ventilation  8/25/2023 1411 by Baylee Camilo RN  Outcome: Met  8/25/2023 1238 by Baylee Camilo RN  Outcome: Ongoing, Progressing     Problem: Impaired Wound Healing  Goal: Optimal Wound Healing  Outcome: Met     Problem: Fall Injury Risk  Goal: Absence of Fall and Fall-Related Injury  Outcome: Met     Problem: Occupational Therapy  Goal: Occupational Therapy Goal  Description: Goals to be met by: 9/13/2023     Patient will increase functional independence with ADLs by performing:    Feeding with Supervision.  UE Dressing with Stand-by Assistance.  LE Dressing with Stand-by Assistance.  Grooming while standing at sink with Stand-by Assistance.  Toileting from toilet with Stand-by Assistance for hygiene and clothing management.   Toilet transfer to toilet with Stand-by Assistance.    Outcome: Met

## 2023-08-27 LAB
BACTERIA BLD CULT: NORMAL
BACTERIA BLD CULT: NORMAL

## 2023-08-30 LAB
CHROM BANDING METHOD: NORMAL
CHROMOSOME ANALYSIS BM ADDITIONAL INFORMATION: NORMAL
CHROMOSOME ANALYSIS BM RELEASED BY: NORMAL
CHROMOSOME ANALYSIS BM RESULT SUMMARY: NORMAL
CLINICAL CYTOGENETICIST REVIEW: NORMAL
KARYOTYP MAR: NORMAL
MAYO MISCELLANEOUS RESULT (REF): NORMAL
REASON FOR REFERRAL (NARRATIVE): NORMAL
REF LAB TEST METHOD: NORMAL
SPECIMEN SOURCE: NORMAL
SPECIMEN: NORMAL

## 2023-08-30 NOTE — PHYSICIAN QUERY
PT Name: Rosmery Ramos  MR #: [id number]     DOCUMENTATION CLARIFICATION     CDS: Yonatan Copeland RN CCDS               Contact information: [email or web address]      This form is a permanent document in the medical record.    Query Date: August 30, 2023    By submitting this query, we are merely seeking further clarification of documentation to reflect the severity of illness of your patient. Please utilize your independent clinical judgment when addressing the question(s) below.    The Medical Record reflects the following:     Indicators   Supporting Clinical Findings Location in Medical Record     x Lab Value(s)  08/22/23 18:09 08/22/23 18:10 08/23/23 03:19   Potassium 5.3 (H) 5.3 (H) 5.7 (H)      08/22/23 18:09 08/22/23 18:10 08/23/23 03:19 08/24/23 04:16   Phosphorus 2.6 (L) 2.6 (L) 2.3 (L) 2.4 (L)    Lab values     x Treatment/Medication sodium phosphate 20.01 mmol IVPB x 1  sodium zirconium cyclosilicate 10 g PO x 1  lactated ringers infusion @ 100 mL/hr  potassium phosphate 20 mmol IVPB x 1 8/23/2023 Medications      8/24/2023 Medication    Other       Provider, please specify the diagnosis or diagnoses that correspond(s) to the above indicators. Isaias all that apply:    [   x] Hyperkalemia   [   ] Hypophosphatemia   [   ] Other electrolyte disturbance (please specify): __________   [   ]  Clinically Undetermined       Please document in your progress notes daily for the duration of treatment until resolved, and include in your discharge summary.

## 2023-08-30 NOTE — PROGRESS NOTES
Faxed discharge summary and updated desaturation note to Chrissy at [facility] ([phone] at her request.  Will follow.

## 2023-08-30 NOTE — PHYSICIAN QUERY
PT Name: Rosmery Ramos  MR #: [id number]    DOCUMENTATION CLARIFICATION     CDS: Yonatan Copeland RN CCDS               Contact information: [email or web address]    This form is a permanent document in the medical record.     Query Date: August 30, 2023    By submitting this query, we are merely seeking further clarification of documentation.  Please utilize your independent clinical judgment when addressing the question(s) below.    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  PENDING ADDITIONAL FISH AND MOLECULAR ANALYSIS FOR FINAL SUBCLASSIFICATION.  SEE COMMENT.   DYSGRANULOPOIESIS AND DECREASED ERYTHROPOIESIS.   A LAMBDA LIGHT CHAIN PREDOMINANT PLASMA CELL POPULATION (10%).   MARKEDLY DECREASED STORAGE IRON.   ADEQUATE NUMBER OF MEGAKARYOCYTES.  8/22/2023 Pathology report       Please clarify the pathology findings.    [  x ] Pathology findings noted above are ruled in/confirmed as diagnoses   [   ] Pathology findings noted above are not confirmed as diagnoses   [   ] Other diagnosis (please specify): ___________   [  ] Clinically Undetermined     Please document in your progress notes daily for the duration of treatment until resolved and include in your discharge summary.    Form No. [id number]

## 2023-08-30 NOTE — PHYSICIAN QUERY
PT Name: Rosmery Ramos  MR #: [id number]    DOCUMENTATION CLARIFICATION     CDS: Yonatan Copeland RN CCDS               Contact information: [email or web address]    This form is a permanent document in the medical record.     Query Date: August 30, 2023    By submitting this query, we are merely seeking further clarification of documentation. Please utilize your independent clinical judgment when addressing the question(s) below.    The Medical Record contains the following:   Indicators   Supporting Clinical Findings Location in Medical Record     x AMS, Confusion,  LOC, etc.  AAO x2. Disoriented to time and situation.     confused but per daughter this is acute and a month ago pt was living alone, driving, doing ADLs with no limitations. Altered mental status. Patient with unclear baseline mentation.    Pt is AAOx1 to self; disoriented to place, time, and situation.  8/22/2023 Nursing notes     8/22/2023 H&P        8/23/2023 Nursing notes     x Acute/Chronic Illness Pneumonia due to infectious organism  Acute hypoxemic respiratory failure  Acute leukemia 8/22/2023 H&P    Radiology Findings       x Electrolyte Imbalance  08/22/23 18:09 08/22/23 18:10 08/23/23 03:19 08/24/23 04:16   Phosphorus 2.6 (L) 2.6 (L) 2.3 (L) 2.4 (L)      08/22/23 18:09 08/22/23 18:10 08/23/23 03:19   Potassium 5.3 (H) 5.3 (H) 5.7 (H)    Lab values     x Medication azithromycin (ZITHROMAX) 500 mg IVPB q24h  azithromycin 500 mg PO q24h  cefTRIAXone (ROCEPHIN) 1 g IVPB q24h  levoFLOXacin 750 mg PO daily  sodium phosphate 20.01 mmol IVPB x 1  sodium zirconium cyclosilicate 10 g PO x 1  lactated ringers infusion @ 100 mL/hr  potassium phosphate 20 mmol IVPB x 1 8/22/2023 Medication  8/23/2023 Medication  8/22-8/24/2023 Medication  8/24-8/25/2023 Medication  8/23/2023 Medications      8/24/2023 Medication    Treatment               x Other Concern for encephalopathy due to ongoing infection, vs underlying dementia exacerbated by infection and  malignancy    Patient more oriented this morning; person time and place.    Patient AAOx4, although confused. Delayed responses. 8/23/2023 BMT progress notes      8/24/2023 BMT progress notes      8/25/2023 Nursing notes     The noted clinical guidelines are only system guidelines and do not replace the providers clinical judgment.    The National Rudd of Neurologic Disorders and Stroke (NINDS) of the NIH describes encephalopathy as any diffuse disease of the brain that alters brain function or structure.    Provider, please specify the diagnosis or diagnoses associated with above clinical findings.    [ x  ] Metabolic Encephalopathy - Due to electrolyte imbalance, metabolic derangements, or infectious processes, includes Septic Encephalopathy, Uremic Encephalopathy   [   ] Other Encephalopathy (please specify): ____________________   [   ] Other neurological condition- Includes Post-ictal altered mental status (please specify condition): __________   [   ]  Clinically Undetermined         Please document in your progress notes daily for the duration of treatment until resolved, and include in your discharge summary.    References:  MADHU Dash RN, CCDS. (2018, June 9). Notes from the Instructor: Encephalopathy tips. Retrieved October 22, 2020, from [email or web address]    ICD-10-CM/PCS Unfold Integrated Codebook (Version V.20.8.10.0) [Computer software]. (2020). Retrieved October 21, 2020.    National Rudd of Neurological Disorders and Stroke. (2019, March 27). Retrieved October 22, 2020, from https://www.ninds.nih.gov/Disorders/All-Disorders/Pvybsdvgnfzdoj-Rqaotypqrcw-Vnzw    Form No. [id number]

## 2023-09-01 LAB — MAYO MISCELLANEOUS RESULT (REF): NORMAL

## 2023-09-06 LAB — MAYO MISCELLANEOUS RESULT (REF): NORMAL

## 2023-09-26 LAB
ANNOTATION COMMENT IMP: NORMAL
COMMENT: NORMAL
DX: NORMAL
FINAL PATHOLOGIC DIAGNOSIS: NORMAL
GROSS: NORMAL
Lab: NORMAL
MICROSCOPIC EXAM: NORMAL
NGS CLINCIAL TRIALS: NORMAL
NGS INTERPRETATION: NORMAL
NGS ONCOHEME PANEL GENE LIST: NORMAL
NGS PATHOGENIC MUTATIONS DETECTED: NORMAL
NGS REVIEWED BY:: NORMAL
NGS VARIANTS OF UNKNOWN SIGNIFICANCE: NORMAL
NGSHM RESULT, BLOOD: NORMAL
REF LAB TEST METHOD: NORMAL
SPECIMEN SOURCE: NORMAL
SUPPLEMENTAL DIAGNOSIS: NORMAL
TEST PERFORMANCE INFO SPEC: NORMAL

## 2023-11-27 PROBLEM — J96.01 ACUTE HYPOXEMIC RESPIRATORY FAILURE: Status: RESOLVED | Noted: 2023-08-22 | Resolved: 2023-11-27

## 2023-11-27 PROBLEM — J18.9 PNEUMONIA DUE TO INFECTIOUS ORGANISM: Status: RESOLVED | Noted: 2023-08-22 | Resolved: 2023-11-27
